# Patient Record
Sex: MALE | Race: WHITE | NOT HISPANIC OR LATINO | Employment: OTHER | URBAN - METROPOLITAN AREA
[De-identification: names, ages, dates, MRNs, and addresses within clinical notes are randomized per-mention and may not be internally consistent; named-entity substitution may affect disease eponyms.]

---

## 2019-08-07 ENCOUNTER — OFFICE VISIT (OUTPATIENT)
Dept: UROLOGY | Facility: CLINIC | Age: 64
End: 2019-08-07
Payer: COMMERCIAL

## 2019-08-07 VITALS
WEIGHT: 250 LBS | DIASTOLIC BLOOD PRESSURE: 72 MMHG | BODY MASS INDEX: 39.24 KG/M2 | HEART RATE: 80 BPM | SYSTOLIC BLOOD PRESSURE: 132 MMHG | HEIGHT: 67 IN

## 2019-08-07 DIAGNOSIS — R97.20 ELEVATED PROSTATE SPECIFIC ANTIGEN (PSA): Primary | ICD-10-CM

## 2019-08-07 PROCEDURE — 99202 OFFICE O/P NEW SF 15 MIN: CPT | Performed by: UROLOGY

## 2019-08-07 RX ORDER — LISINOPRIL 10 MG/1
10 TABLET ORAL DAILY
Status: ON HOLD | COMMUNITY
End: 2021-07-22

## 2019-08-07 RX ORDER — HYDROCHLOROTHIAZIDE 25 MG/1
25 TABLET ORAL DAILY
Status: ON HOLD | COMMUNITY
End: 2021-07-22

## 2019-08-07 RX ORDER — DEXTROAMPHETAMINE SACCHARATE, AMPHETAMINE ASPARTATE, DEXTROAMPHETAMINE SULFATE AND AMPHETAMINE SULFATE 2.5; 2.5; 2.5; 2.5 MG/1; MG/1; MG/1; MG/1
10 TABLET ORAL 2 TIMES DAILY
COMMUNITY
End: 2021-05-20

## 2019-08-07 RX ORDER — DEXTROAMPHETAMINE SACCHARATE, AMPHETAMINE ASPARTATE MONOHYDRATE, DEXTROAMPHETAMINE SULFATE AND AMPHETAMINE SULFATE 7.5; 7.5; 7.5; 7.5 MG/1; MG/1; MG/1; MG/1
30 CAPSULE, EXTENDED RELEASE ORAL DAILY
COMMUNITY

## 2019-08-07 ASSESSMENT — PAIN SCALES - GENERAL: PAINLEVEL: NO PAIN (0)

## 2019-08-07 ASSESSMENT — MIFFLIN-ST. JEOR: SCORE: 1882.62

## 2019-08-07 NOTE — LETTER
Date:August 8, 2019      Patient was self referred, no letter generated. Do not send.        [facility] Health Information

## 2019-08-07 NOTE — LETTER
8/7/2019       RE: Gil Chase  [address]  [place] [facility] [address]     Dear Colleague,    Thank you for referring your patient, Gil Chase, to the [facility] [place] [facility]. Please see a copy of my visit note below.    Gil Chase is a 63-year-old male with a PSA of 3.1 this year, 3.362 years ago and 1.9 in 2011.  He also showed me a PSA of 3.62 in 2018 from the [facility].  The patient has hypogonadal and has been on testosterone supplementation for years.  He has no family history of prostate cancer.  He is having no trouble voiding, dysuria or hematuria  Other past medical history: Vasectomy, vasectomy reversal, non-smoker, hypertension, ADD  Medications: Adderall, HydroDIURIL, lisinopril  Allergies: None  Exam: Alert and oriented, normal appearance other than abdominal obesity, normal vital signs, normal respirations, neuro grossly intact.  Normal sphincter tone, no rectal mass or impaction, benign and symmetric prostate, normal seminal vesicles  Assessment: No evidence for prostate cancer.  Hypogonadism- should resume testosterone supplementation and have regular follow-up for testosterone levels, PSA and ARLEEN yearly    Again, thank you for allowing me to participate in the care of your patient.      Sincerely,    Ze Warren MD

## 2019-08-07 NOTE — LETTER
8/7/2019      RE: Gli Chase  [address]  [place] [address]       Gil Chase is a 63-year-old male with a PSA of 3.1 this year, 3.362 years ago and 1.9 in 2011.  He also showed me a PSA of 3.62 in 2018 from the [facility].  The patient has hypogonadal and has been on testosterone supplementation for years.  He has no family history of prostate cancer.  He is having no trouble voiding, dysuria or hematuria  Other past medical history: Vasectomy, vasectomy reversal, non-smoker, hypertension, ADD  Medications: Adderall, HydroDIURIL, lisinopril  Allergies: None  Exam: Alert and oriented, normal appearance other than abdominal obesity, normal vital signs, normal respirations, neuro grossly intact.  Normal sphincter tone, no rectal mass or impaction, benign and symmetric prostate, normal seminal vesicles  Assessment: No evidence for prostate cancer.  Hypogonadism- should resume testosterone supplementation and have regular follow-up for testosterone levels, PSA and ARLEEN yearly    Ze Warren MD

## 2019-08-07 NOTE — PROGRESS NOTES
iGl Chase is a 63-year-old male with a PSA of 3.1 this year, 3.362 years ago and 1.9 in 2011.  He also showed me a PSA of 3.62 in 2018 from the [facility].  The patient has hypogonadal and has been on testosterone supplementation for years.  He has no family history of prostate cancer.  He is having no trouble voiding, dysuria or hematuria  Other past medical history: Vasectomy, vasectomy reversal, non-smoker, hypertension, ADD  Medications: Adderall, HydroDIURIL, lisinopril  Allergies: None  Exam: Alert and oriented, normal appearance other than abdominal obesity, normal vital signs, normal respirations, neuro grossly intact.  Normal sphincter tone, no rectal mass or impaction, benign and symmetric prostate, normal seminal vesicles  Assessment: No evidence for prostate cancer.  Hypogonadism- should resume testosterone supplementation and have regular follow-up for testosterone levels, PSA and ARLEEN yearly

## 2019-08-07 NOTE — NURSING NOTE
"Chief Complaint   Patient presents with     Elevated PSA     Pt is here for Elevated PSA to est. care       Patient Active Problem List   Diagnosis   (none) - all problems resolved or deleted       No Known Allergies    /72   Pulse 80   Ht 1.702 m (5' 7\")   Wt 113.4 kg (250 lb)   BMI 39.16 kg/m            Davie Martell, EMT-B  8/7/2019         "

## 2021-05-20 ENCOUNTER — HOSPITAL ENCOUNTER (OUTPATIENT)
Facility: CLINIC | Age: 66
Setting detail: OBSERVATION
Discharge: HOME | End: 2021-05-21
Attending: EMERGENCY MEDICINE | Admitting: HOSPITALIST
Payer: COMMERCIAL

## 2021-05-20 DIAGNOSIS — L03.311 CELLULITIS OF ABDOMINAL WALL: ICD-10-CM

## 2021-05-20 DIAGNOSIS — N28.9 RENAL INSUFFICIENCY: ICD-10-CM
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
## 2021-05-20 DIAGNOSIS — I44.0 1ST DEGREE AV BLOCK: ICD-10-CM

## 2021-05-20 LAB
ANION GAP SERPL CALCULATED.3IONS-SCNC: 7 MMOL/L (ref 3–14)
B BURGDOR IGG+IGM SER QL: 0.29 (ref 0–0.89)
BASOPHILS # BLD AUTO: 0.1 10E9/L (ref 0–0.2)
BASOPHILS NFR BLD AUTO: 1.3 %
BUN SERPL-MCNC: 20 MG/DL (ref 7–30)
CALCIUM SERPL-MCNC: 9.1 MG/DL (ref 8.5–10.1)
CHLORIDE SERPL-SCNC: 104 MMOL/L (ref 94–109)
CO2 SERPL-SCNC: 28 MMOL/L (ref 20–32)
CREAT SERPL-MCNC: 1.41 MG/DL (ref 0.66–1.25)
CRP SERPL-MCNC: 15.7 MG/L (ref 0–8)
DIFFERENTIAL METHOD BLD: NORMAL
EOSINOPHIL # BLD AUTO: 0.4 10E9/L (ref 0–0.7)
EOSINOPHIL NFR BLD AUTO: 5.4 %
ERYTHROCYTE [DISTWIDTH] IN BLOOD BY AUTOMATED COUNT: 13.2 % (ref 10–15)
GFR SERPL CREATININE-BSD FRML MDRD: 51 ML/MIN/{1.73_M2}
GLUCOSE SERPL-MCNC: 89 MG/DL (ref 70–99)
HCT VFR BLD AUTO: 42.4 % (ref 40–53)
HGB BLD-MCNC: 14.3 G/DL (ref 13.3–17.7)
IMM GRANULOCYTES # BLD: 0 10E9/L (ref 0–0.4)
IMM GRANULOCYTES NFR BLD: 0.3 %
INTERPRETATION ECG - MUSE: NORMAL
LABORATORY COMMENT REPORT: NORMAL
LACTATE BLD-SCNC: 0.9 MMOL/L (ref 0.7–2)
LYMPHOCYTES # BLD AUTO: 1.6 10E9/L (ref 0.8–5.3)
LYMPHOCYTES NFR BLD AUTO: 21.6 %
MCH RBC QN AUTO: 29.6 PG (ref 26.5–33)
MCHC RBC AUTO-ENTMCNC: 33.7 G/DL (ref 31.5–36.5)
MCV RBC AUTO: 88 FL (ref 78–100)
MONOCYTES # BLD AUTO: 0.6 10E9/L (ref 0–1.3)
MONOCYTES NFR BLD AUTO: 7.9 %
NEUTROPHILS # BLD AUTO: 4.8 10E9/L (ref 1.6–8.3)
NEUTROPHILS NFR BLD AUTO: 63.5 %
NRBC # BLD AUTO: 0 10*3/UL
NRBC BLD AUTO-RTO: 0 /100
PLATELET # BLD AUTO: 203 10E9/L (ref 150–450)
POTASSIUM SERPL-SCNC: 3.2 MMOL/L (ref 3.4–5.3)
RBC # BLD AUTO: 4.83 10E12/L (ref 4.4–5.9)
SARS-COV-2 RNA RESP QL NAA+PROBE: NEGATIVE
SODIUM SERPL-SCNC: 139 MMOL/L (ref 133–144)
SPECIMEN SOURCE: NORMAL
WBC # BLD AUTO: 7.6 10E9/L (ref 4–11)

## 2021-05-20 PROCEDURE — 99204 OFFICE O/P NEW MOD 45 MIN: CPT | Performed by: SURGERY

## 2021-05-20 PROCEDURE — 96375 TX/PRO/DX INJ NEW DRUG ADDON: CPT

## 2021-05-20 PROCEDURE — 99220 PR INITIAL OBSERVATION CARE,LEVEL III: CPT | Performed by: PHYSICIAN ASSISTANT

## 2021-05-20 PROCEDURE — 83605 ASSAY OF LACTIC ACID: CPT | Performed by: EMERGENCY MEDICINE

## 2021-05-20 PROCEDURE — 96365 THER/PROPH/DIAG IV INF INIT: CPT

## 2021-05-20 PROCEDURE — 76604 US EXAM CHEST: CPT

## 2021-05-20 PROCEDURE — 86618 LYME DISEASE ANTIBODY: CPT | Performed by: EMERGENCY MEDICINE

## 2021-05-20 PROCEDURE — C9803 HOPD COVID-19 SPEC COLLECT: HCPCS

## 2021-05-20 PROCEDURE — 250N000011 HC RX IP 250 OP 636: Performed by: EMERGENCY MEDICINE

## 2021-05-20 PROCEDURE — 258N000003 HC RX IP 258 OP 636: Performed by: HOSPITALIST

## 2021-05-20 PROCEDURE — G0378 HOSPITAL OBSERVATION PER HR: HCPCS

## 2021-05-20 PROCEDURE — 85025 COMPLETE CBC W/AUTO DIFF WBC: CPT | Performed by: EMERGENCY MEDICINE

## 2021-05-20 PROCEDURE — 36415 COLL VENOUS BLD VENIPUNCTURE: CPT | Performed by: EMERGENCY MEDICINE

## 2021-05-20 PROCEDURE — 250N000013 HC RX MED GY IP 250 OP 250 PS 637: Performed by: PHYSICIAN ASSISTANT

## 2021-05-20 PROCEDURE — 80048 BASIC METABOLIC PNL TOTAL CA: CPT | Performed by: EMERGENCY MEDICINE

## 2021-05-20 PROCEDURE — 99285 EMERGENCY DEPT VISIT HI MDM: CPT | Mod: 25

## 2021-05-20 PROCEDURE — 99207 PR CDG-CODE CATEGORY CHANGED: CPT | Performed by: PHYSICIAN ASSISTANT

## 2021-05-20 PROCEDURE — 250N000011 HC RX IP 250 OP 636: Performed by: HOSPITALIST

## 2021-05-20 PROCEDURE — 86140 C-REACTIVE PROTEIN: CPT | Performed by: EMERGENCY MEDICINE

## 2021-05-20 PROCEDURE — 250N000013 HC RX MED GY IP 250 OP 250 PS 637: Performed by: EMERGENCY MEDICINE

## 2021-05-20 PROCEDURE — 87040 BLOOD CULTURE FOR BACTERIA: CPT | Performed by: EMERGENCY MEDICINE

## 2021-05-20 PROCEDURE — 87635 SARS-COV-2 COVID-19 AMP PRB: CPT | Performed by: EMERGENCY MEDICINE

## 2021-05-20 PROCEDURE — 93005 ELECTROCARDIOGRAM TRACING: CPT

## 2021-05-20 RX ORDER — CEFAZOLIN SODIUM 2 G/100ML
2 INJECTION, SOLUTION INTRAVENOUS EVERY 8 HOURS
Status: DISCONTINUED | OUTPATIENT
Start: 2021-05-20 | End: 2021-05-20

## 2021-05-20 RX ORDER — DOXYCYCLINE 100 MG/1
100 CAPSULE ORAL 2 TIMES DAILY
Status: ON HOLD | COMMUNITY
End: 2021-05-21

## 2021-05-20 RX ORDER — NALOXONE HYDROCHLORIDE 0.4 MG/ML
0.2 INJECTION, SOLUTION INTRAMUSCULAR; INTRAVENOUS; SUBCUTANEOUS
Status: DISCONTINUED | OUTPATIENT
Start: 2021-05-20 | End: 2021-05-21 | Stop reason: HOSPADM

## 2021-05-20 RX ORDER — POLYETHYLENE GLYCOL 3350 17 G/17G
17 POWDER, FOR SOLUTION ORAL DAILY PRN
Status: DISCONTINUED | OUTPATIENT
Start: 2021-05-20 | End: 2021-05-21 | Stop reason: HOSPADM

## 2021-05-20 RX ORDER — OXYCODONE HYDROCHLORIDE 5 MG/1
5-10 TABLET ORAL
Status: DISCONTINUED | OUTPATIENT
Start: 2021-05-20 | End: 2021-05-21 | Stop reason: HOSPADM

## 2021-05-20 RX ORDER — DEXTROAMPHETAMINE SACCHARATE, AMPHETAMINE ASPARTATE MONOHYDRATE, DEXTROAMPHETAMINE SULFATE AND AMPHETAMINE SULFATE 7.5; 7.5; 7.5; 7.5 MG/1; MG/1; MG/1; MG/1
30 CAPSULE, EXTENDED RELEASE ORAL DAILY
Status: DISCONTINUED | OUTPATIENT
Start: 2021-05-21 | End: 2021-05-21 | Stop reason: HOSPADM

## 2021-05-20 RX ORDER — NALOXONE HYDROCHLORIDE 0.4 MG/ML
0.4 INJECTION, SOLUTION INTRAMUSCULAR; INTRAVENOUS; SUBCUTANEOUS
Status: DISCONTINUED | OUTPATIENT
Start: 2021-05-20 | End: 2021-05-21 | Stop reason: HOSPADM

## 2021-05-20 RX ORDER — DOXYCYCLINE 100 MG/1
100 CAPSULE ORAL 2 TIMES DAILY
Status: DISCONTINUED | OUTPATIENT
Start: 2021-05-20 | End: 2021-05-21 | Stop reason: HOSPADM

## 2021-05-20 RX ORDER — POTASSIUM CHLORIDE 1500 MG/1
40 TABLET, EXTENDED RELEASE ORAL ONCE
Status: COMPLETED | OUTPATIENT
Start: 2021-05-20 | End: 2021-05-20

## 2021-05-20 RX ORDER — CEFAZOLIN SODIUM 2 G/100ML
2 INJECTION, SOLUTION INTRAVENOUS ONCE
Status: COMPLETED | OUTPATIENT
Start: 2021-05-20 | End: 2021-05-20

## 2021-05-20 RX ORDER — LISINOPRIL 10 MG/1
10 TABLET ORAL DAILY
Status: DISCONTINUED | OUTPATIENT
Start: 2021-05-21 | End: 2021-05-21 | Stop reason: HOSPADM

## 2021-05-20 RX ORDER — HYDROCHLOROTHIAZIDE 25 MG/1
25 TABLET ORAL DAILY
Status: DISCONTINUED | OUTPATIENT
Start: 2021-05-21 | End: 2021-05-21 | Stop reason: HOSPADM

## 2021-05-20 RX ORDER — ACETAMINOPHEN 325 MG/1
650 TABLET ORAL EVERY 4 HOURS PRN
Status: DISCONTINUED | OUTPATIENT
Start: 2021-05-20 | End: 2021-05-21 | Stop reason: HOSPADM

## 2021-05-20 RX ORDER — AMOXICILLIN 250 MG
2 CAPSULE ORAL 2 TIMES DAILY PRN
Status: DISCONTINUED | OUTPATIENT
Start: 2021-05-20 | End: 2021-05-21 | Stop reason: HOSPADM

## 2021-05-20 RX ORDER — DEXTROAMPHETAMINE SACCHARATE, AMPHETAMINE ASPARTATE, DEXTROAMPHETAMINE SULFATE AND AMPHETAMINE SULFATE 2.5; 2.5; 2.5; 2.5 MG/1; MG/1; MG/1; MG/1
20 TABLET ORAL DAILY
Status: DISCONTINUED | OUTPATIENT
Start: 2021-05-20 | End: 2021-05-21 | Stop reason: HOSPADM

## 2021-05-20 RX ORDER — LIDOCAINE 40 MG/G
CREAM TOPICAL
Status: DISCONTINUED | OUTPATIENT
Start: 2021-05-20 | End: 2021-05-21 | Stop reason: HOSPADM

## 2021-05-20 RX ORDER — ONDANSETRON 2 MG/ML
4 INJECTION INTRAMUSCULAR; INTRAVENOUS EVERY 6 HOURS PRN
Status: DISCONTINUED | OUTPATIENT
Start: 2021-05-20 | End: 2021-05-21 | Stop reason: HOSPADM

## 2021-05-20 RX ORDER — AMOXICILLIN 250 MG
1 CAPSULE ORAL 2 TIMES DAILY PRN
Status: DISCONTINUED | OUTPATIENT
Start: 2021-05-20 | End: 2021-05-21 | Stop reason: HOSPADM

## 2021-05-20 RX ORDER — ONDANSETRON 4 MG/1
4 TABLET, ORALLY DISINTEGRATING ORAL EVERY 6 HOURS PRN
Status: DISCONTINUED | OUTPATIENT
Start: 2021-05-20 | End: 2021-05-21 | Stop reason: HOSPADM

## 2021-05-20 RX ORDER — ACETAMINOPHEN 650 MG/1
650 SUPPOSITORY RECTAL EVERY 4 HOURS PRN
Status: DISCONTINUED | OUTPATIENT
Start: 2021-05-20 | End: 2021-05-21 | Stop reason: HOSPADM

## 2021-05-20 RX ORDER — DEXTROAMPHETAMINE SACCHARATE, AMPHETAMINE ASPARTATE, DEXTROAMPHETAMINE SULFATE AND AMPHETAMINE SULFATE 5; 5; 5; 5 MG/1; MG/1; MG/1; MG/1
20 TABLET ORAL DAILY
COMMUNITY

## 2021-05-20 RX ADMIN — DOXYCYCLINE HYCLATE 100 MG: 100 CAPSULE ORAL at 20:13

## 2021-05-20 RX ADMIN — VANCOMYCIN HYDROCHLORIDE 1500 MG: 10 INJECTION, POWDER, LYOPHILIZED, FOR SOLUTION INTRAVENOUS at 18:25

## 2021-05-20 RX ADMIN — POTASSIUM CHLORIDE 40 MEQ: 1500 TABLET, EXTENDED RELEASE ORAL at 10:20

## 2021-05-20 RX ADMIN — CEFAZOLIN SODIUM 2 G: 2 INJECTION, SOLUTION INTRAVENOUS at 10:23

## 2021-05-20 ASSESSMENT — ENCOUNTER SYMPTOMS
NAUSEA: 0
ARTHRALGIAS: 0
FATIGUE: 1
HEADACHES: 0
COLOR CHANGE: 1
CHILLS: 0
FEVER: 0

## 2021-05-20 ASSESSMENT — MIFFLIN-ST. JEOR: SCORE: 1725.2

## 2021-05-20 NOTE — PLAN OF CARE
PRIMARY DIAGNOSIS: CELLULITIS   OUTPATIENT/OBSERVATION GOALS TO BE MET BEFORE DISCHARGE:  1. Vitals sign stable or return to baseline: Yes    2. Tolerating oral antibiotics or has home infusion set up if applicable:  IV Ancef     3. Pain status: Pain free.    4. Return to near baseline physical activity: Yes    Patient alert and oriented x4. Vitals are Temp: 98.6  F (37  C) Temp src: Oral BP: (!) 145/78 Pulse: 75   Resp: 16 SpO2: 100 % RA. Denies pain. Umbilicus and surrounding skin is red and hot to touch, not expanded beyond borders. IV saline locked. Tolerating regular diet. Up independently in room. General surgery consult, no intervention at this time- to readdress tomorrow. Continuing with supportive cares and symptom management.     Discharge Planner Nurse   Safe discharge environment identified: Yes  Barriers to discharge: No       Entered by: Carolina Olmos 05/20/2021     Please review provider order for any additional goals.     Nurse to notify provider when observation goals have been met and patient is ready for discharge.

## 2021-05-20 NOTE — CONSULTS
General surgery consult note    Reason for consultation: Abdominal wall cellulitis    History of present illness: This is a 65-year-old gentleman who was recently found to have an engorged tick in his umbilicus.  He has developed cellulitis centered around the umbilicus.  He was initially treated with doxycycline and had expansion of an area of erythema surrounding this.  He presented to the emergency room this morning.  He has just noticed a bit of drainage from the umbilicus today.  He states that the tick was actually within his umbilicus.  The patient denies fever or chills.  Denies arthralgias.  Denies any urinary symptoms.  The patient does not smoke.    Past medical history:  Type 2 diabetes  Hypertension  ADHD    Past Surgical History:   Procedure Laterality Date     AR REV VASO-VASECTOMY ADJ       VASECTOMY      pt has vas reversal     10 point review of systems is negative other than those issues noted above.    Physical exam:  The patient is in no obvious distress.  Breathing is nonlabored.  The abdomen reveals a broad area of erythema centered around the umbilicus.  Within the umbilicus is a small area of grayish skin with a small open area.  The patient indicates that this is the site of the tick bite.  There is no obvious fluctuance.  Drainage from the region is serous.    Assessment and plan: This is a patient with an approximate 25 cm area of erythema surrounding the umbilicus.  He was just started on Ancef this morning.  It might be my leaning to include MRSA coverage as well, particularly given the broad area involved.  If he does not have significant improvement in the next 24 hours, it might be worth considering adding anaerobic coverage or obtaining ID consultation.  At this point, I do not see any obvious indication for surgical debridement.  The patient is currently eating dinner, and operative intervention would not currently be an option.  I think it would be worth keeping him n.p.o. after  midnight just in case some sort of surgical intervention might be necessary tomorrow.  I am not currently anticipating surgical intervention, but would like to keep our options open.    Silvano Jett MD  [facility]

## 2021-05-20 NOTE — ED TRIAGE NOTES
Patient presents with tick bite from April 23-24th. He was seen at PCP on 5/17 and found to have the tick still embedded, put on Doxycycline. Redness, swelling has increased, and was told to come in.

## 2021-05-20 NOTE — PLAN OF CARE
PRIMARY DIAGNOSIS: CELLULITIS   OUTPATIENT/OBSERVATION GOALS TO BE MET BEFORE DISCHARGE:  Vitals sign stable or return to baseline: Yes    Tolerating oral antibiotics or has home infusion set up if applicable:  IV Ancef     Pain status: Pain free.    Return to near baseline physical activity: Yes    Patient alert and oriented x4. Vitals are Temp: 96.2  F (35.7  C) Temp src: Oral BP: 133/77 Pulse: 68   Resp: 16 SpO2: 98 % RA. Denies pain. Umbilicus and surrounding skin is red and hot to touch, not expanded beyond borders. IV saline locked. Tolerating regular diet. Up independently in room. General surgery consult. Continuing with supportive cares and symptom management.     Discharge Planner Nurse   Safe discharge environment identified: Yes  Barriers to discharge: No       Entered by: Carolina Olmos 05/20/2021     Please review provider order for any additional goals.     Nurse to notify provider when observation goals have been met and patient is ready for discharge.

## 2021-05-20 NOTE — ED PROVIDER NOTES
History   Chief Complaint:  Insect Bite     The history is provided by the patient.      Gil Chase is a 65 year old male with history of type II diabetes and hypertension who presents with an insect bite. The patient [place] and began to feel pressure and pain around his umbilicus last Saturday, prompting him to be evaluated on Monday where he was given Doxycycline. He was seen earlier today by his primary care physician for worsening symptoms of pain, fatigue, and increasing redness around the umbilicus and was referred here. Denies fevers, chills, drainage from the umbilicus, joint pain, headaches, or nausea.     Review of Systems   Constitutional: Positive for fatigue. Negative for chills and fever.   Gastrointestinal: Negative for nausea.   Musculoskeletal: Negative for arthralgias.   Skin: Positive for color change and rash.   Neurological: Negative for headaches.   All other systems reviewed and are negative.    Allergies:  No known drug allergies    Medications:  Vibramycin  Adderall  Adderall XR  Prinvil, Zestril  Hydrochlorothiazide  Viagra  Metformin     Past Medical History:    Type II diabetes  ADHD  Erectile dysfunction of organic origin  Hypogonadism male  Hypertension  Glucose intolerance  Sleep apnea  Benign neoplasm of colon  Obesity    Past Surgical History:    Vasectomy  Vasectomy, reversal   ORIF, left femur     Social History:  Presents alone. Denies smoking.       Physical Exam     Patient Vitals for the past 24 hrs:   BP Temp Temp src Pulse Resp SpO2   05/20/21 0833 (!) 140/86 96.4  F (35.8  C) Temporal 68 18 98 %       Physical Exam  General:   Well-nourished   Speaking in full sentences  Eyes:   Conjunctiva without injection or scleral icterus  ENT:   Moist mucous membranes   Nares patent   Pinnae normal  Neck:   Full ROM   No stiffness appreciated  Resp:   Lungs CTAB   No crackles, wheezing or audible rubs   Good air  movement  CV:    Normal rate, regular rhythm   S1 and S2 present   No murmur, gallop or rub  GI:   BS present   Abdomen soft without distention   Large circular erythematous rash centered around umbilicus   Scant drainage from umbilicus   Erythema has extended beyond borders of prior outline from Monday     Skin:   Warm, dry, well perfused   No rashes or open wounds on exposed skin  MSK:   Moves all extremities   No focal deformities or swelling  Neuro:   Alert   Answers questions appropriately   Moves all extremities equally   Gait stable  Psych:   Normal affect, normal mood    Emergency Department Course   ECG  ECG taken at 1027, ECG read at 1027  Sinus rhythm with 1st degree AV block  Otherwise normal ECG   Rate 69 bpm. NE interval 216 ms. QRS duration 106 ms.   QT/QTc 416/445 ms. P-R-T axes 33 -17 40.     Imaging:  POC US SOFT TISSUE  Limited Bedside ED Ultrasound of Soft Tissue Procedure Note:    PROCEDURE: PERFORMED BY: Dr. Carrillo Miles MD  INDICATIONS/SYMPTOM: Skin redness, evaluate for abscess, cellulitis or foreign body  PROBE: High frequency linear probe  BODY LOCATION: Soft tissue located on trunk     FINDINGS: Cobblestoning of soft tissue: present   Hypoechoic fluid (ie abscess) identified: absent     INTERPRETATION:  The soft tissue and muscle layers were evaluated.      Findings indicate cellulitis    IMAGE DOCUMENTATION: Images were archived to PACs system.    Laboratory:  CBC: WBC 7.6, HGB 14.3,    BMP: Potassium 3.2(L), Creatinine 1.41(H), GFR 51(L) o/w WNL  CRP inflammation: 15.7(H)  Blood culture x2: Pending  Lactic acid (Resulted 0924): 0.9  Lyme Disease Hayley with reflex to WB serum: Pending    Emergency Department Course:    Reviewed:  I reviewed nursing notes, vitals, past medical history and care everywhere    Assessments:  0859 I obtained history and examined the patient as noted above.   0931 I performed a bedside ultrasound.   1015 I rechecked the patient and explained  findings.     Consults:   1026 D/W hospitalist, Hortencia Baum.     Interventions:  1020 Klor-Con M 40 mEq Oral  1023 Ancef 2 g IV    Disposition:  The patient was admitted to the hospital under the care of Dr. Ram.     Impression & Plan   CMS Diagnoses: None     Medical Decision Making:  Gil Chase is a 65-year-old male presenting to the emergency department for evaluation of an insect bite.  VS on presentation reveal elevated BP although otherwise are unremarkable.  Examination as outlined above notable for large area of erythema centered around the umbilicus consistent with cellulitis.  On exam, and following ultrasound, no evidence at this time of deeper space abscess amenable to percutaneous drainage.  His erythema has extended beyond the borders previously outlined, and thus I have concern for failure of outpatient antibiotic therapy.  Laboratory evaluation reveals normal WBC count, though elevated CRP.  Lactate presently normal, and patient not meeting criteria for severe sepsis nor septic shock.  Blood cultures x2 obtained and are presently pending.  The region of cellulitis may be secondary to superimposed bacterial infection following the insect bite.  Underlying Lyme's disease on the differential.  Lyme's testing will be sent from the ER, the results of which are presently pending.  He will likely benefit from ongoing treatment with doxycycline.  Remainder of skin exam without evidence of erythema migrans, nor does patient have associated headache, neck stiffness, or AMS to suggest meningitis.  EKG demonstrates sinus rhythm without evidence of 3rd degree AV block to suggest Lyme carditis, though he does have 1st degree AV block.  Given failure of outpatient treatment, patient will be started on Ancef, which has better strep coverage than oral doxycycline.  Patient has no prior history of MRSA, and wound is appeared nonpurulent.  Will require admission for response to therapy.  If patient fails to  respond to additional IV antibiotics, may consider repeat imaging down the road.  Case discussed with MARIA ELENA Betancourt. The hospital service who has accepted the patient for admission.     Covid-19  Gil Chase was evaluated during a global COVID-19 pandemic, which necessitated consideration that the patient might be at risk for infection with the SARS-CoV-2 virus that causes COVID-19.   Applicable protocols for evaluation were followed during the patient's care. COVID-19 was considered as part of the patient's evaluation. The plan for testing is: a test was obtained during this visit.    Diagnosis:    ICD-10-CM    1. Cellulitis of abdominal wall  L03.311 Basic metabolic panel     CRP inflammation     Blood culture     Asymptomatic SARS-CoV-2 COVID-19 Virus (Coronavirus) by PCR     Lyme Disease Hayley with reflex to WB Serum     Lyme Disease Hayley with reflex to WB Serum     CANCELED: Lyme Disease Hayley with reflex to WB Serum   2. Tick bite, initial encounter  W57.XXXA    3. Renal insufficiency  N28.9    4. 1st degree AV block  I44.0      Scribe Disclosure:  Monserrat DELACRUZ, am serving as a scribe at 8:56 AM on 5/20/2021 to document services personally performed by Carrillo Miles MD based on my observations and the provider's statements to me.              Carrillo Miles MD  05/20/21 1820

## 2021-05-20 NOTE — PROGRESS NOTES
ROOM # 207    Living Situation (if not independent, order SW consult): home with wife and daughter   Facility name:  : [facility] [phone]    Activity level at baseline: Independent   Activity level on admit: Independent       Patient registered to observation; given Patient Bill of Rights; given the opportunity to ask questions about observation status and their plan of care.  Patient has been oriented to the observation room, bathroom and call light is in place.    Discussed discharge goals and expectations with patient/family.

## 2021-05-20 NOTE — H&P
History and Physical     Gil Chase MRN# [id number]   YOB: 1955 Age: 65 year old      Date of Admission:  5/20/2021    Primary care provider: No Ref-Primary, Physician          Assessment and Plan:   Gil Chase is a 65 year old male with a PMH significant for DM II,  HTN and ADHD who presents with abdominal wall redness, swelling and pain after tick bite.     Patient was discussed with Dr. Miles, who was provider in ED. Chart review of ED work up was reviewed as well as chart review of Care Everywhere, previous visits and admissions.     ADDENDUM: surgery consulted and does not think drainage is necessary. They recommend switching Ancef to Vancomycin and if no improvement tomorrow to add anaerobic coverage and consult ID    #Abdominal wall cellulitis s/p tick bite  Noted to have worsening erythema, pain and induration surrounding umbilicus for weeks. Seen in clinic on 5/18 and tick removed from umbilicus. Redness outlined and started on doxycycline but redness spread beyond borders and induration around umbilicus worsening. No systemic symptoms. Afebrile, vitally stable and WBC normal. Ultrasound of abdomen did not show obvious fluid collection. Started on Ancef in ED.  -Continue Ancef  -General surgery consult in case drainage is needed  -Continue doxycycline for lymes prophylaxis  -Monitor for signs of worsening  -Last TDAP in 8/2019    #DM II  A1c was 4.8 and glucose in ED 89  -Continue Metformin and Semaglutide    #HTN  -Continue Lisinopril and HCTZ    #Hypokalemia  Potassium 3.2, uses hydrochlorothiazide which is likely the reason  -Potassium 40 meq po once    #HLP  -Continue statin    #ADHD  -Continue Adderall          Social: No concerns  Code: Discussed with patient and they have chosen full code  VTE prophylaxis: Lovenox  Disposition: Observation                    Chief Complaint:   Abdominal wall redness, swelling and pain         History of Present Illness:   Gil Chase is a  65 year old male who presents with with abdominal wall redness, swelling and pain. He states for the last few weeks he has noted increased swelling and discomfort in his abdomen near his belly button. He has also noted some redness but no drainage from this area. He went to the clinic on 5/18 where they found an engorged tick in his umbilicus that had probably been there since his trip [place] in [place]. Tick was removed and he was started on Doxycycline with the redness around umbilicus outlined but the redness has spread beyond those borders. He is tender around the umbilicus and denies drainage from where the tick was. He denies fever, chills, arthralgias, nausea, vomiting, diarrhea and urinary symptoms. He does not drink alcohol or smoke cigarettes.              Past Medical History:   DM II  HTN  ADHD            Past Surgical History:     Past Surgical History:   Procedure Laterality Date     AR REV VASO-VASECTOMY ADJ       VASECTOMY      pt has vas reversal               Social History:     Social History     Socioeconomic History     Marital status:      Spouse name: Not on file     Number of children: Not on file     Years of education: Not on file     Highest education level: Not on file   Occupational History     Not on file   Social Needs     Financial resource strain: Not on file     Food insecurity     Worry: Not on file     Inability: Not on file     Transportation needs     Medical: Not on file     Non-medical: Not on file   Tobacco Use     Smoking status: Never Smoker     Smokeless tobacco: Never Used   Substance and Sexual Activity     Alcohol use: Yes     Comment: 2oz wk     Drug use: Not on file     Sexual activity: Yes   Lifestyle     Physical activity     Days per week: Not on file     Minutes per session: Not on file     Stress: Not on file   Relationships     Social connections     Talks on phone: Not on file     Gets together: Not on file     Attends Orthodox service: Not on file      Active member of club or organization: Not on file     Attends meetings of clubs or organizations: Not on file     Relationship status: Not on file     Intimate partner violence     Fear of current or ex partner: Not on file     Emotionally abused: Not on file     Physically abused: Not on file     Forced sexual activity: Not on file   Other Topics Concern     Parent/sibling w/ CABG, MI or angioplasty before 65F 55M? Not Asked   Social History Narrative     Not on file               Family History:   Family history reviewed and is non contributory         Allergies:    No Known Allergies            Medications:     Prior to Admission medications    Medication Sig Last Dose Taking? Auth Provider   amphetamine-dextroamphetamine (ADDERALL XR) 30 MG 24 hr capsule Take 30 mg by mouth daily   Reported, Patient   amphetamine-dextroamphetamine (ADDERALL) 10 MG tablet Take 10 mg by mouth 2 times daily   Reported, Patient   hydrochlorothiazide (HYDRODIURIL) 25 MG tablet Take 25 mg by mouth daily    Reported, Patient   lisinopril (PRINIVIL/ZESTRIL) 10 MG tablet Take 10 mg by mouth daily   Reported, Patient              Review of Systems:   A Comprehensive greater than 10 system review of systems was carried out.  Pertinent positives and negatives are noted above.  Otherwise negative for contributory information.            Physical Exam:   Blood pressure (!) 140/86, pulse 68, temperature 96.4  F (35.8  C), temperature source Temporal, resp. rate 18, SpO2 98 %.  Exam:  GENERAL:  Comfortable.  PSYCH: pleasant, oriented, No acute distress.  HEENT:  PERRLA. Normal conjunctiva, normal hearing, nasal mucosa and Oropharynx are normal.  NECK:  Supple, no neck vein distention, adenopathy or bruits, normal thyroid.  HEART:  Normal S1, S2 with no murmur, no pericardial rub, gallops or S3 or S4.  LUNGS:  Clear to auscultation, normal Respiratory effort. No wheezing, rales or ronchi.  ABDOMEN:  Soft, no hepatosplenomegaly, normal bowel  sounds. Erythema noted about 12 inches in diameter surrounding umbilicus. Some induration noted around umbilicus but no fluctuance.   EXTREMITIES:  No pedal edema, +2 pulses bilateral and equal.  SKIN:  Dry to touch, No rash, wound or ulcerations.  NEUROLOGIC:  CN 2-12 grossly intact, sensation is intact with no focal deficits.               Data:     Recent Labs   Lab 05/20/21  0909   WBC 7.6   HGB 14.3   HCT 42.4   MCV 88        Recent Labs   Lab 05/20/21  0909      POTASSIUM 3.2*   CHLORIDE 104   CO2 28   ANIONGAP 7   GLC 89   BUN 20   CR 1.41*   GFRESTIMATED 51*   GFRESTBLACK 60*   RACHEL 9.1     Recent Labs   Lab 05/20/21  0909   CRP 15.7*     Recent Labs   Lab 05/20/21  0904   LACT 0.9         Recent Results (from the past 24 hour(s))   POC US SOFT TISSUE    Impression    Limited Bedside ED Ultrasound of Soft Tissue Procedure Note:    PROCEDURE: PERFORMED BY: Dr. Carrillo Miles MD  INDICATIONS/SYMPTOM: Skin redness, evaluate for abscess, cellulitis or foreign body  PROBE: High frequency linear probe  BODY LOCATION: Soft tissue located on trunk     FINDINGS: Cobblestoning of soft tissue: present   Hypoechoic fluid (ie abscess) identified: absent     INTERPRETATION:  The soft tissue and muscle layers were evaluated.      Findings indicate cellulitis    IMAGE DOCUMENTATION: Images were archived to PACs system.             Henny Baum PA-C    This patient was seen and discussed with Dr. Ram who agrees with the current plans as outlined above.

## 2021-05-20 NOTE — PHARMACY-ADMISSION MEDICATION HISTORY
Admission medication history interview status for this patient is complete. See [facility] admission navigator for allergy information, prior to admission medications and immunization status.     Medication history interview done, indicate source(s): Patient  Medication history resources (including written lists, pill bottles, clinic record):None  Pharmacy: [facility] [place] and [facility] pharmacy.    Changes made to PTA medication list:  Added: doxycycline, semaglutide  Deleted: none  Changed: adderall IR    Actions taken by pharmacist (provider contacted, etc):None     Additional medication history information: Metformin 1g bid placed ON HOLD by MD about two weeks ago due to low BGs. Semaglutide weekly on Tuesdays. Patient stated that he will not be needing his IR Adderall while hospitalized. Doxycycline: so far, took 7 doses at home, prescribed x 28 doses.    Medication reconciliation/reorder completed by provider prior to medication history?  no   (Y/N)     For patients on insulin therapy: no    Prior to Admission medications    Medication Sig Last Dose Taking? Auth Provider   amphetamine-dextroamphetamine (ADDERALL XR) 30 MG 24 hr capsule Take 30 mg by mouth daily 5/20/2021 at am Yes Reported, Patient   amphetamine-dextroamphetamine (ADDERALL) 20 MG tablet Take 20 mg by mouth daily 5/19/2021 at Unknown time Yes Unknown, Entered By History   doxycycline hyclate (VIBRAMYCIN) 100 MG capsule Take 100 mg by mouth 2 times daily X14 days 5/20/2021 at am Yes Unknown, Entered By History   hydrochlorothiazide (HYDRODIURIL) 25 MG tablet Take 25 mg by mouth daily  5/20/2021 at am Yes Reported, Patient   lisinopril (PRINIVIL/ZESTRIL) 10 MG tablet Take 10 mg by mouth daily 5/20/2021 at am Yes Reported, Patient   SEMAGLUTIDE,0.25 OR 0.5MG/DOS, SC Inject 0.5 mg Subcutaneous once a week On Tuesdays 5/18/2021 Yes Unknown, Entered By History

## 2021-05-20 NOTE — ED NOTES
[facility]  ED Nurse Handoff Report    Gil Chase is a 65 year old male   ED Chief complaint: Insect Bite  . ED Diagnosis:   Final diagnoses:   Cellulitis of abdominal wall   Tick bite, initial encounter   Renal insufficiency   1st degree AV block     Allergies: No Known Allergies    Code Status: Full Code  Activity level - Baseline/Home:  Independent. Activity Level - Current:   Independent. Lift room needed: No. Bariatric: No   Needed: No   Isolation: No. Infection: Not Applicable.     Vital Signs:   Vitals:    05/20/21 0833   BP: (!) 140/86   Pulse: 68   Resp: 18   Temp: 96.4  F (35.8  C)   TempSrc: Temporal   SpO2: 98%       Cardiac Rhythm:  ,      Pain level:    Patient confused: No. Patient Falls Risk: Yes.   Elimination Status: Has voided   Patient Report - Initial Complaint: cellulitis abdomen. Focused Assessment:    65-year-old male presenting to the emergency department for evaluation of an insect bite.  VS on presentation reveal elevated BP although otherwise are unremarkable.  Examination as outlined above notable for large area of erythema centered around the umbilicus consistent with cellulitis.  On exam, and following ultrasound, no evidence at this time of deeper space abscess amenable to percutaneous drainage.  His erythema has extended beyond the borders previously outlined, and thus I have concern for failure of outpatient antibiotic therapy.  Laboratory evaluation reveals normal WBC count, though elevated CRP.  Lactate presently normal, and patient not meeting criteria for severe sepsis nor septic shock.  Blood cultures x2 obtained and are presently pending.  The region of cellulitis may be secondary to superimposed bacterial infection following the insect bite.  Underlying Lyme's disease on the differential.  Lyme's testing will be sent from the ER, the results of which are presently pending.  He will likely benefit from ongoing treatment with doxycycline.  Remainder  of skin exam without evidence of erythema migrans, nor does patient have associated headache, neck stiffness, or AMS to suggest meningitis.  EKG demonstrates sinus rhythm without evidence of AV block to suggest Lyme carditis.  Given failure of outpatient treatment, patient will be started on Ancef, which has better strep coverage than oral doxycycline.  Patient has no prior history of MRSA, and wound is appeared nonpurulent.  Will require admission for response to therapy.  If patient fails to respond to additional IV antibiotics, may consider repeat imaging down the road.  Case discussed with Dr.The hospital service who has accepted the patient for admission.      MadelineBjornMonserrat M  5/20/2021 10:52 AM - Draft       []Hide copied text    []Kristina for details     History   Chief Complaint:  Insect Bite     The history is provided by the patient.      Gil Chase is a 65 year old male with history of type II diabetes and hypertension who presents with an insect bite. The patient [place] and began to feel pressure and pain around his umbilicus last Saturday, prompting him to be evaluated on Monday where he was given Doxycycline. He was seen earlier today by his primary care physician for worsening symptoms of pain, fatigue, and increasing redness around the umbilicus and was referred here. Denies fevers, chills, drainage from the umbilicus, joint pain, headaches, or nausea.      Review of Systems   Constitutional: Positive for fatigue. Negative for chills and fever.   Gastrointestinal: Negative for nausea.   Musculoskeletal: Negative for arthralgias.   Skin: Positive for color change and rash.   Neurological: Negative for headaches.   All other systems reviewed and are negative.     Allergies:  No known drug allergies     Medications:  Vibramycin  Adderall  Adderall XR  Prinvil, Zestril  Hydrochlorothiazide  Viagra  Metformin      Past Medical History:    Type II  diabetes  ADHD  Erectile dysfunction of organic origin  Hypogonadism male  Hypertension  Glucose intolerance  Sleep apnea  Benign neoplasm of colon  Obesity     Past Surgical History:    Vasectomy  Vasectomy, reversal   ORIF, left femur      Social History:  Presents alone. Denies smoking.            Tests Performed:   Labs Ordered and Resulted from Time of ED Arrival Up to the Time of Departure from the ED   BASIC METABOLIC PANEL - Abnormal; Notable for the following components:       Result Value    Potassium 3.2 (*)     Creatinine 1.41 (*)     GFR Estimate 51 (*)     GFR Estimate If Black 60 (*)     All other components within normal limits   CRP INFLAMMATION - Abnormal; Notable for the following components:    CRP Inflammation 15.7 (*)     All other components within normal limits   CBC WITH PLATELETS DIFFERENTIAL   LACTIC ACID WHOLE BLOOD   SARS-COV-2 (COVID-19) VIRUS RT-PCR   LYME DISEASE WIL WITH REFLEX TO WB SERUM   BLOOD CULTURE   BLOOD CULTURE     POC US SOFT TISSUE   Final Result   Limited Bedside ED Ultrasound of Soft Tissue Procedure Note:      PROCEDURE: PERFORMED BY: Dr. Carrillo Miles MD   INDICATIONS/SYMPTOM: Skin redness, evaluate for abscess, cellulitis or foreign body   PROBE: High frequency linear probe   BODY LOCATION: Soft tissue located on trunk       FINDINGS: Cobblestoning of soft tissue: present    Hypoechoic fluid (ie abscess) identified: absent       INTERPRETATION:  The soft tissue and muscle layers were evaluated.       Findings indicate cellulitis      IMAGE DOCUMENTATION: Images were archived to PACs system.             . Abnormal Results: see EMR.   Treatments provided:   Medications   ceFAZolin (ANCEF) intermittent infusion 2 g in 100 mL dextrose PRE-MIX (0 g Intravenous Stopped 5/20/21 1102)   potassium chloride ER (KLOR-CON M) CR tablet 40 mEq (40 mEq Oral Given 5/20/21 1020)       Family Comments: no family present  OBS brochure/video discussed/provided to patient:  Yes  ED  Medications:   Medications   ceFAZolin (ANCEF) intermittent infusion 2 g in 100 mL dextrose PRE-MIX (0 g Intravenous Stopped 5/20/21 1102)   potassium chloride ER (KLOR-CON M) CR tablet 40 mEq (40 mEq Oral Given 5/20/21 1020)     Drips infusing:  No  For the majority of the shift, the patient's behavior Green. Interventions performed were NA.    Sepsis treatment initiated: No     Patient tested for COVID 19 prior to admission: YES    ED Nurse Name/Phone Number: FARIDEH ALEJO RN,   11:03 AM    RECEIVING UNIT ED HANDOFF REVIEW    Above ED Nurse Handoff Report was reviewed: Yes  Reviewed by: Carolina Olmos RN on May 20, 2021 at 11:22 AM

## 2021-05-20 NOTE — PHARMACY-VANCOMYCIN DOSING SERVICE
Pharmacy Vancomycin Initial Note  Date of Service May 20, 2021  Patient's  1955  65 year old, male    Indication: Skin and Soft Tissue Infection    Current estimated CrCl = Estimated Creatinine Clearance: 58.2 mL/min (A) (based on SCr of 1.41 mg/dL (H)).    Creatinine for last 3 days  2021:  9:09 AM Creatinine 1.41 mg/dL    Recent Vancomycin Level(s) for last 3 days  No results found for requested labs within last 72 hours.      Vancomycin IV Administrations (past 72 hours)      No vancomycin orders with administrations in past 72 hours.                Nephrotoxins and other renal medications (From now, onward)    Start     Dose/Rate Route Frequency Ordered Stop    21 0800  lisinopril (ZESTRIL) tablet 10 mg      10 mg Oral DAILY 21 1320      21 1830  vancomycin 1500 mg in 0.9% NaCl 250 ml intermittent infusion 1,500 mg      1,500 mg  over 90 Minutes Intravenous EVERY 12 HOURS 21 1802            Contrast Orders - past 72 hours (72h ago, onward)    None                  Plan:  1. Start vancomycin  1500 mg IV qq12h.   2. Vancomycin monitoring method: Trough (Method 1 = dosing nomogram)  3. Vancomycin therapeutic monitoring goal: 10-15 mg/L  4. Pharmacy will check vancomycin levels as appropriate in 1-3 Days.    5. Serum creatinine levels will be ordered daily for the first week of therapy and at least twice weekly for subsequent weeks.      Kermit Severson, [facility]

## 2021-05-21 VITALS
RESPIRATION RATE: 16 BRPM | OXYGEN SATURATION: 100 % | HEART RATE: 71 BPM | HEIGHT: 68 IN | DIASTOLIC BLOOD PRESSURE: 65 MMHG | WEIGHT: 214 LBS | BODY MASS INDEX: 32.43 KG/M2 | SYSTOLIC BLOOD PRESSURE: 130 MMHG | TEMPERATURE: 96.6 F

## 2021-05-21 LAB
ANION GAP SERPL CALCULATED.3IONS-SCNC: 3 MMOL/L (ref 3–14)
BUN SERPL-MCNC: 17 MG/DL (ref 7–30)
CALCIUM SERPL-MCNC: 9.2 MG/DL (ref 8.5–10.1)
CHLORIDE SERPL-SCNC: 107 MMOL/L (ref 94–109)
CO2 SERPL-SCNC: 30 MMOL/L (ref 20–32)
CREAT SERPL-MCNC: 1.39 MG/DL (ref 0.66–1.25)
ERYTHROCYTE [DISTWIDTH] IN BLOOD BY AUTOMATED COUNT: 13.2 % (ref 10–15)
GFR SERPL CREATININE-BSD FRML MDRD: 52 ML/MIN/{1.73_M2}
GLUCOSE SERPL-MCNC: 99 MG/DL (ref 70–99)
HCT VFR BLD AUTO: 42.7 % (ref 40–53)
HGB BLD-MCNC: 14.6 G/DL (ref 13.3–17.7)
MCH RBC QN AUTO: 29.6 PG (ref 26.5–33)
MCHC RBC AUTO-ENTMCNC: 34.2 G/DL (ref 31.5–36.5)
MCV RBC AUTO: 86 FL (ref 78–100)
PLATELET # BLD AUTO: 203 10E9/L (ref 150–450)
POTASSIUM SERPL-SCNC: 3.3 MMOL/L (ref 3.4–5.3)
RBC # BLD AUTO: 4.94 10E12/L (ref 4.4–5.9)
SODIUM SERPL-SCNC: 140 MMOL/L (ref 133–144)
WBC # BLD AUTO: 9 10E9/L (ref 4–11)

## 2021-05-21 PROCEDURE — 99217 PR OBSERVATION CARE DISCHARGE: CPT | Performed by: PHYSICIAN ASSISTANT

## 2021-05-21 PROCEDURE — G0378 HOSPITAL OBSERVATION PER HR: HCPCS

## 2021-05-21 PROCEDURE — 258N000003 HC RX IP 258 OP 636: Performed by: HOSPITALIST

## 2021-05-21 PROCEDURE — 250N000013 HC RX MED GY IP 250 OP 250 PS 637: Performed by: PHYSICIAN ASSISTANT

## 2021-05-21 PROCEDURE — 36415 COLL VENOUS BLD VENIPUNCTURE: CPT | Performed by: PHYSICIAN ASSISTANT

## 2021-05-21 PROCEDURE — 85027 COMPLETE CBC AUTOMATED: CPT | Performed by: PHYSICIAN ASSISTANT

## 2021-05-21 PROCEDURE — 80048 BASIC METABOLIC PNL TOTAL CA: CPT | Performed by: PHYSICIAN ASSISTANT

## 2021-05-21 PROCEDURE — 250N000011 HC RX IP 250 OP 636: Performed by: HOSPITALIST

## 2021-05-21 PROCEDURE — 96376 TX/PRO/DX INJ SAME DRUG ADON: CPT

## 2021-05-21 RX ORDER — POTASSIUM CHLORIDE 1500 MG/1
40 TABLET, EXTENDED RELEASE ORAL ONCE
Status: COMPLETED | OUTPATIENT
Start: 2021-05-21 | End: 2021-05-21

## 2021-05-21 RX ORDER — LIDOCAINE HYDROCHLORIDE 10 MG/ML
10 INJECTION, SOLUTION EPIDURAL; INFILTRATION; INTRACAUDAL; PERINEURAL ONCE
Status: DISCONTINUED | OUTPATIENT
Start: 2021-05-21 | End: 2021-05-21 | Stop reason: HOSPADM

## 2021-05-21 RX ORDER — DOXYCYCLINE 100 MG/1
100 CAPSULE ORAL 2 TIMES DAILY
Qty: 16 CAPSULE | Refills: 0 | COMMUNITY
Start: 2021-05-21 | End: 2021-05-28

## 2021-05-21 RX ORDER — CEFADROXIL 500 MG/1
1000 CAPSULE ORAL EVERY 12 HOURS SCHEDULED
Status: DISCONTINUED | OUTPATIENT
Start: 2021-05-21 | End: 2021-05-21 | Stop reason: HOSPADM

## 2021-05-21 RX ORDER — CEFADROXIL 500 MG/1
1000 CAPSULE ORAL EVERY 12 HOURS SCHEDULED
Status: DISCONTINUED | OUTPATIENT
Start: 2021-05-21 | End: 2021-05-21

## 2021-05-21 RX ORDER — CEFADROXIL 500 MG/1
1000 CAPSULE ORAL 2 TIMES DAILY
Qty: 28 CAPSULE | Refills: 0 | Status: SHIPPED | OUTPATIENT
Start: 2021-05-21 | End: 2021-05-28

## 2021-05-21 RX ADMIN — LISINOPRIL 10 MG: 10 TABLET ORAL at 08:31

## 2021-05-21 RX ADMIN — POTASSIUM CHLORIDE 40 MEQ: 1500 TABLET, EXTENDED RELEASE ORAL at 08:57

## 2021-05-21 RX ADMIN — VANCOMYCIN HYDROCHLORIDE 1500 MG: 10 INJECTION, POWDER, LYOPHILIZED, FOR SOLUTION INTRAVENOUS at 06:15

## 2021-05-21 RX ADMIN — DOXYCYCLINE HYCLATE 100 MG: 100 CAPSULE ORAL at 08:31

## 2021-05-21 RX ADMIN — DEXTROAMPHETAMINE SACCHARATE, AMPHETAMINE ASPARTATE MONOHYDRATE, DEXTROAMPHETAMINE SULFATE, AND AMPHETAMINE SULFATE 30 MG: 7.5; 7.5; 7.5; 7.5 CAPSULE, EXTENDED RELEASE ORAL at 08:31

## 2021-05-21 NOTE — PLAN OF CARE
PRIMARY DIAGNOSIS: CELLULITIS   OUTPATIENT/OBSERVATION GOALS TO BE MET BEFORE DISCHARGE:  1. Vitals sign stable or return to baseline: Yes    2. Tolerating oral antibiotics or has home infusion set up if applicable:  IV Ancef     3. Pain status: Pain free.    4. Return to near baseline physical activity: Yes    Vitals are Temp: 97.9  F (36.6  C) Temp src: Oral BP: 131/78 Pulse: 67   Resp: 18 SpO2: 98 %.    Patient is Alert and Oriented x4. Denies any pain with interview. He is independent with no assistive devices in room. Pt is a Regular diet.  IV site is CDI with no s/sx of infection or infiltration. Patient is Saline locked. Cellulitis at abdomen around umbilicus and surrounding skin is red and warm to touch, not expanded beyond borders. Will continue to monitor.     Discharge Planner Nurse   Safe discharge environment identified: Yes  Barriers to discharge: No       Entered by: Jaye Hand 05/21/2021     Please review provider order for any additional goals.     Nurse to notify provider when observation goals have been met and patient is ready for discharge.

## 2021-05-21 NOTE — DISCHARGE SUMMARY
[facility]  Discharge Summary  Hospitalist    Date of Admission:  5/20/2021  Date of Discharge:  5/21/2021    Discharging Provider: Yessy Baum PAC    Discharge Diagnoses   1. Tick bite with associated cellulitis.  Discharging on Duricef (for cellulitis) and doxycycline (for Lyme's disease prophylaxis).   2. Hypertension    Discharge Disposition     Discharged to home    Condition at Discharge:  Stable    History of Present Illness   Gil Chase is an 65 year old male with DM2, hypertension, and ADHD who presented to [facility] ED on 5/20/2021 with increasing periumbilical redness and swelling.  Patient had been seen at outside facility for a tick bite.  It is believed that the tick been present in his bellybutton for several weeks before he found it.  He discussed at length developed redness, induration, and warmth around his bellybutton.  He was started on doxycycline but unfortunately the redness spread beyond the borders and was quite painful.  Patient came into the ER for further evaluation.    The ED, patient was hemodynamically stable.  Patient is afebrile and without leukocytosis.  Ultrasound the abdomen did not show any obvious fluid collection.  Patient started on Ancef.  Admission requested.    Patient remained afebrile during his stay.  He was dilated by General Surgery (Dr. Jett) who did not feel there was any need for surgical debridement as there was no obvious fluid collection.  Patient was also seen by Infectious Disease who recommended transition to Duricef for the associated cellulitis and continuation of doxycycline for Lyme's prophylaxis.  Area of redness stabilized and patient noted improvement in pain.  He was comfortable discharging home on the above antibiotics with outpatient primary care provider follow-up.      WOOD Geronimo    Pending Results   These results will be followed up by hospitalist  Unresulted Labs Ordered in the Past 30 Days of this Admission      Date and Time Order Name Status Description    5/20/2021 0911 Blood culture Preliminary     5/20/2021 0905 Blood culture Preliminary           Code Status   Full Code       Primary Care Physician   Physician No Ref-Primary    Consultations This Hospital Stay   SURGERY GENERAL IP CONSULT  PHARMACY TO DOSE VANCO  INFECTIOUS DISEASES IP CONSULT    Time Spent on this Encounter   I, WOOD Geronimo, personally saw the patient today and spent greater than 30 minutes discharging this patient.    Allergies   No Known Allergies    Physical Exam   Temp: 96.6  F (35.9  C) Temp src: Oral BP: 130/65 Pulse: 71   Resp: 16 SpO2: 100 % O2 Device: None (Room air)    Vitals:    05/20/21 1159   Weight: 97.1 kg (214 lb)     Vital Signs with Ranges  Temp:  [96.6  F (35.9  C)-98.6  F (37  C)] 96.6  F (35.9  C)  Pulse:  [66-75] 71  Resp:  [16-18] 16  BP: (125-145)/(65-78) 130/65  SpO2:  [97 %-100 %] 100 %  No intake/output data recorded.      GENERAL:  Pleasant, cooperative, alert. Well developed, well nourished.   HEENT: Normocephalic, atraumatic.  Extra occular mm intact.  Sclera clear. PERRL.  Mucous membranes moist.    PULMONOLOGY: Clear   CARDIAC: Regular    ABDOMEN: Soft, nontender.  Fairly large area of redness around the belly-button, about 8-10 inches in diameter that, per patient, has stabilized.  No fluctuance or bogginess.    MUSCULOSKELETAL:  Moving x 4 spontaneously with CMS intact x4.  Normal bulk and tone.    NEURO: Alert and oriented x3.  CN II-XII grossly intact and symmetric.  No ataxia or tremor.  Nonfocal.    Discharge Medications   Current Discharge Medication List      START taking these medications    Details   cefadroxil (DURICEF) 500 MG capsule Take 2 capsules (1,000 mg) by mouth 2 times daily for 7 days  Qty: 28 capsule, Refills: 0    Associated Diagnoses: Cellulitis of abdominal wall; Tick bite, initial encounter         CONTINUE these medications which have CHANGED    Details   doxycycline hyclate  (VIBRAMYCIN) 100 MG capsule Take 1 capsule (100 mg) by mouth 2 times daily X 8 more days  Qty: 16 capsule, Refills: 0         CONTINUE these medications which have NOT CHANGED    Details   amphetamine-dextroamphetamine (ADDERALL XR) 30 MG 24 hr capsule Take 30 mg by mouth daily      amphetamine-dextroamphetamine (ADDERALL) 20 MG tablet Take 20 mg by mouth daily      hydrochlorothiazide (HYDRODIURIL) 25 MG tablet Take 25 mg by mouth daily       lisinopril (PRINIVIL/ZESTRIL) 10 MG tablet Take 10 mg by mouth daily      SEMAGLUTIDE,0.25 OR 0.5MG/DOS, SC Inject 0.5 mg Subcutaneous once a week On Tuesdays             Data   Most Recent 3 CBC's:  Recent Labs   Lab Test 05/21/21  0634 05/20/21  0909   WBC 9.0 7.6   HGB 14.6 14.3   MCV 86 88    203      Most Recent 3 BMP's:  Recent Labs   Lab Test 05/21/21  0634 05/20/21  0909    139   POTASSIUM 3.3* 3.2*   CHLORIDE 107 104   CO2 30 28   BUN 17 20   CR 1.39* 1.41*   ANIONGAP 3 7   RACHEL 9.2 9.1   GLC 99 89     Most Recent 6 Bacteria Isolates From Any Culture (See EPIC Reports for Culture Details):  Recent Labs   Lab Test 05/20/21  0958 05/20/21  0909   CULT No growth after 15 hours No growth after 20 hours     Most Recent TSH, T4 and A1c Labs:No lab results found.  Results for orders placed or performed during the hospital encounter of 05/20/21   POC US SOFT TISSUE    Impression    Limited Bedside ED Ultrasound of Soft Tissue Procedure Note:    PROCEDURE: PERFORMED BY: Dr. Carrillo Miles MD  INDICATIONS/SYMPTOM: Skin redness, evaluate for abscess, cellulitis or foreign body  PROBE: High frequency linear probe  BODY LOCATION: Soft tissue located on trunk     FINDINGS: Cobblestoning of soft tissue: present   Hypoechoic fluid (ie abscess) identified: absent     INTERPRETATION:  The soft tissue and muscle layers were evaluated.      Findings indicate cellulitis    IMAGE DOCUMENTATION: Images were archived to PACs system.           Discharge Orders      Reason for  your hospital stay    You were brought in for evaluation of abdominal redness following tick bite.  You were seen by General Surgery who did not feel you needed any additional opening of the wound to assess for abscess.  You were also seen by Infectious Disease who recommends you continue with oral doxycycline for 8 more days in addition to another antibiotic called Duricef for 7 days.  Both medications are to be taken twice daily (ideally, every 12 hours).  Please follow-up with your PMD in 3-5 days for hospital follow-up.  Return to the ER if you develop fever, abnormal drainage from the tick bite, or any other concerning symptoms.     When to contact your care team    Call your primary doctor if you have any of the following: temperature greater than 101, worsening shortness of breath, increased swelling, worsening pain, new or unrelenting diarrhea, dizziness/passing out, falls, bleeding that doesn't stop, uncontrolled pain, loss of taste, loss of smell, or any other new or concerning symptoms.  Call 911 or go to the Emergency Room if you need immediate assistance.

## 2021-05-21 NOTE — PLAN OF CARE
PRIMARY DIAGNOSIS: CELLULITIS   OUTPATIENT/OBSERVATION GOALS TO BE MET BEFORE DISCHARGE:  1. Vitals sign stable or return to baseline: Yes    2. Tolerating oral antibiotics or has home infusion set up if applicable:  IV Ancef     3. Pain status: Pain free.    4. Return to near baseline physical activity: Yes    Vitals are Temp: 97.1  F (36.2  C) Temp src: Oral BP: 125/77 Pulse: 68   Resp: 16 SpO2: 98 %.    Patient is Alert and Oriented x4. Denies any pain with interview. He is independent with no assistive devices in room. Pt is a Regular diet but is been NPO since [facility].  IV site is CDI with no s/sx of infection or infiltration. Patient is Saline locked. Cellulitis at abdomen around umbilicus and surrounding skin is red and warm to touch, not expanded beyond borders. Tolerating antibiotic. Plan is General surgery consult. Continuing with supportive cares and symptom management.     Discharge Planner Nurse   Safe discharge environment identified: Yes  Barriers to discharge: No       Entered by: Jaye Hand 05/21/2021     Please review provider order for any additional goals.     Nurse to notify provider when observation goals have been met and patient is ready for discharge.

## 2021-05-21 NOTE — PLAN OF CARE
PRIMARY DIAGNOSIS: CELLULITIS   OUTPATIENT/OBSERVATION GOALS TO BE MET BEFORE DISCHARGE:  1. Vitals sign stable or return to baseline: Yes    2. Tolerating oral antibiotics or has home infusion set up if applicable:  IV Ancef     3. Pain status: Pain free.    4. Return to near baseline physical activity: Yes    Vitals are Temp: 98.5  F (36.9  C) Temp src: Oral BP: 129/75 Pulse: 74   Resp: 18 SpO2: 99 %.  Patient is Alert and Oriented x4. Denies any pain with interview. He is independent with no assistive devices in room. Pt is a Regular diet.  IV site is CDI with no s/sx of infection or infiltration.  Patient is Saline locked. Cellulitis at abdomen around umbilicus and surrounding skin is red and warm to touch, not expanded beyond borders. Will continue to monitor.     Discharge Planner Nurse   Safe discharge environment identified: Yes  Barriers to discharge: No       Entered by: Jaye Hand 05/20/2021     Please review provider order for any additional goals.     Nurse to notify provider when observation goals have been met and patient is ready for discharge.

## 2021-05-21 NOTE — CONSULTS
[facility]    Infectious Disease Consultation     Date of Admission:  5/20/2021  Date of Consult 05/21/21    Assessment :  1.Abdominal wall cellulitis following tick bite. Over all improving, redness is regressing. No evidence of deep infection on CT imaging. Etiology likely streptococcal. Doxycycline does not have good streptococcal coverage and it is likely cellulitis progressed on Doxycycline monotherapy.  2. Recent tick bite with tick removal on 5/18  3. DM / HTN    Recommendations  1. Treat with Doxycycline for a total of 10 days  2 Transition to oral antibiotics and discharge on Cefadroxil 1000 mg BID (for 5-7 days) + Doxycycline    ID will sign off , please call with questions as needed    Genna Martin MD    Reason for Consult    I was asked to evaluate this patient for antimicrobial recommendations for cellulitis.    Primary Care Physician   Physician No Ref-Primary    Chief Complaint   Abdominal wall rendness and swelling    History is obtained from the patient and medical records    History of Present Illness   Gil Chase is a 65 year old male with DM, HTN and ADHD who is hospitalized with abdominal wall redness and swelling following a tick bite on 5/18.    Patient noted redness around the umbilicus for several weeks. He was evaluated on 5/18 and had a tick removed from the umbilicus and was initiated on Doxycycline. He continued to have spreading redness and was subsequently hospitalized for admission.  He denies fever, chills or joint pains, no other rash. US abdomen did not show evidence of abscess formation. Patient has been seen by general surgery and has no indication for surgical debridement.    Patient has been maintained on Cefazolin, Vancomycin and Doxycycline, and ID has been asked to assist with further antibiotic management.    Labs demonstrate stable WBC of 9K and markedly elevated CRP of 15.7. Blood cultures have remained negative.    Antimicrobial therapy  5/20  Doxycycline  5/20 Cefazolin  5/20 Vancomycin    Past Medical History   I have reviewed this patient's medical history and updated it with pertinent information if needed.   No past medical history on file.    Past Surgical History   I have reviewed this patient's surgical history and updated it with pertinent information if needed.  Past Surgical History:   Procedure Laterality Date     AR REV VASO-VASECTOMY ADJ       VASECTOMY      pt has vas reversal     Prior to Admission Medications   Prior to Admission Medications   Prescriptions Last Dose Informant Patient Reported? Taking?   SEMAGLUTIDE,0.25 OR 0.5MG/DOS, SC 5/18/2021  Yes Yes   Sig: Inject 0.5 mg Subcutaneous once a week On Tuesdays   amphetamine-dextroamphetamine (ADDERALL XR) 30 MG 24 hr capsule 5/20/2021 at am  Yes Yes   Sig: Take 30 mg by mouth daily   amphetamine-dextroamphetamine (ADDERALL) 20 MG tablet 5/19/2021 at Unknown time  Yes Yes   Sig: Take 20 mg by mouth daily   doxycycline hyclate (VIBRAMYCIN) 100 MG capsule 5/20/2021 at am  Yes Yes   Sig: Take 100 mg by mouth 2 times daily X14 days   hydrochlorothiazide (HYDRODIURIL) 25 MG tablet 5/20/2021 at am  Yes Yes   Sig: Take 25 mg by mouth daily    lisinopril (PRINIVIL/ZESTRIL) 10 MG tablet 5/20/2021 at am  Yes Yes   Sig: Take 10 mg by mouth daily      Facility-Administered Medications: None     Allergies   No Known Allergies    Immunization History   Immunization History   Administered Date(s) Administered     COVID-19,PF,Moderna 04/09/2021, 05/07/2021       Social History   I have reviewed this patient's social history and updated it with pertinent information if needed. Gil Chase  reports that he has never smoked. He has never used smokeless tobacco. He reports current alcohol use.    Family History   I have reviewed this patient's family history and updated it with pertinent information if needed.   No family history on file.    Review of Systems   The 10 point Review of Systems is negative  other than noted in the HPI or here.     Physical Exam   Temp: 96.9  F (36.1  C) Temp src: Oral BP: 128/76 Pulse: 66   Resp: 16 SpO2: 97 % O2 Device: None (Room air)    Vital Signs with Ranges  Temp:  [96.2  F (35.7  C)-98.6  F (37  C)] 96.9  F (36.1  C)  Pulse:  [66-75] 66  Resp:  [16-18] 16  BP: (125-145)/(75-78) 128/76  SpO2:  [97 %-100 %] 97 %  214 lbs 0 oz  Body mass index is 32.54 kg/m .    GENERAL APPEARANCE:  awake  EYES: Eyes grossly normal to inspection, PERRL and conjunctivae and sclerae normal  NECK: no adenopathy, no asymmetry, masses, or scars and thyroid normal to palpation  RESP: lungs clear to auscultation - no rales, rhonchi or wheezes  CV: regular rates and rhythm, normal S1 S2, no S3 or S4 and no murmur, click or rub  ABDOMEN: Abdominal wall erythema regressing  MS: extremities normal- no gross deformities noted  SKIN: no suspicious lesions or rashes      Data   All laboratory data reviewed  Recent Labs   Lab 05/20/21  0958 05/20/21  0909   CULT No growth after 15 hours No growth after 20 hours     Recent Labs   Lab Test 05/20/21  0958 05/20/21  0909   CULT No growth after 15 hours No growth after 20 hours     Imaging  5/19 US soft tissue  Limited Bedside ED Ultrasound of Soft Tissue Procedure Note:     PROCEDURE: PERFORMED BY: Dr. Carrillo Miles MD   INDICATIONS/SYMPTOM: Skin redness, evaluate for abscess, cellulitis or foreign body   PROBE: High frequency linear probe   BODY LOCATION: Soft tissue located on trunk       FINDINGS: Cobblestoning of soft tissue: present   Hypoechoic fluid (ie abscess) identified: absent     INTERPRETATION:  The soft tissue and muscle layers were evaluated.     Findings indicate cellulitis     IMAGE DOCUMENTATION: Images were archived to PACs system

## 2021-05-21 NOTE — PROGRESS NOTES
[facility]    General Surgery Progress Note          Assessment and Plan:   Assessment:    Gil Chase is a 65 year old male admitted with abdominal cellulitis secondary to tick bite with removal of tick on 5/17/2021  - on doxycyline for lyme's prophylaxis       Plan:   - continue Doxy, Vanco  - Cover umbilicus with 2x2 dry gauze to contain any drainage and keep area clean  - ID consult for abx recommendations  - will discuss with Dr Jett any needs for surgical debridement.            Interval History:   Pt resting in bed.  No complaints.  Feels better than admission.  States never really felt that bad prior.  Denies fevers, chills, nausea/vomiting.           Physical Exam:   Temp: 96.9  F (36.1  C) Temp src: Oral BP: 128/76 Pulse: 66   Resp: 16   SpO2: 97 % O2 Device: None (Room air)        No intake/output data recorded.    Constitutional: healthy, alert and no distress   Abdomen: Erythema surrounding umbilicus.  Redness within lines outlined from yesterday.  Indurated tissue surrounding umbilicus.  Small area of excortication/healing inside umbilicus most likely area where tick was removed.  Scant serous drainage in area.   Umbilicus was probed with Qtip with no increase in pain, unable to elicit any drainage.            Data:   Data   Recent Labs   Lab 05/21/21  0634 05/20/21  0909   WBC 9.0 7.6   HGB 14.6 14.3   MCV 86 88    203    139   POTASSIUM 3.3* 3.2*   CHLORIDE 107 104   CO2 30 28   BUN 17 20   CR 1.39* 1.41*   ANIONGAP 3 7   RACHEL 9.2 9.1   GLC 99 89        Kavitha Emanuel PA-C

## 2021-05-21 NOTE — UTILIZATION REVIEW
"[facility]     Admission Status; Secondary Review Determination     Admission Date: 5/20/2021  8:38 AM      Under the authority of the Utilization Management Committee, the utilization review process indicated a secondary review on the above patient.  The review outcome is based on review of the medical records, discussions with staff, and applying clinical experience noted on the date of the review.          (x) Observation Status Appropriate - This patient does not meet hospital inpatient criteria and is placed in observation status. If this patient's primary payer is Medicare and was admitted as an inpatient, Condition Code 44 should be used and patient status changed to \"observation\".     RATIONALE FOR DETERMINATION   65-year-old male with a history of diabetes, hypertension, and ADHD was admitted yesterday with abdominal wall cellulitis from a tick bite. He was started on doxycycline and vancomycin. Infectious disease and general surgery have been consulted. He is not septic nor on IV fluids. No plans for operative intervention. At the time of this review the patient does not meet medical necessity for inpatient hospitalization and observation status is recommended.    The severity of illness, intensity of service provided, expected LOS and risk for adverse outcome make the care appropriate for further observation; however, doesn't meet criteria for hospital inpatient admission.  Yessy MUIR notified of this determination.        The information on this document is developed by the utilization review team in order for the business office to ensure compliance.  This only denotes the appropriateness of proper admission status and does not reflect the quality of care rendered.         The definitions of Inpatient Status and Observation Status used in making the determination above are those provided in the CMS Coverage Manual, Chapter 1 and Chapter 6, section 70.4.      Sincerely,     Marquis Dallas DO " MPH   Physician Advisor  Utilization Review  [facility]

## 2021-05-21 NOTE — PLAN OF CARE
"Patient's After Visit Summary was reviewed with patient.   Patient verbalized understanding of After Visit Summary, recommended follow up and was given an opportunity to ask questions.   Discharge medications sent home with patient/family: Cefadroxil    Discharged with nurse with all belongings. Patient medically cleared. Denied pain. Patient will transport self home.   /65 (BP Location: Right arm)   Pulse 71   Temp 96.6  F (35.9  C) (Oral)   Resp 16   Ht 1.727 m (5' 8\")   Wt 97.1 kg (214 lb)   SpO2 100%   BMI 32.54 kg/m    OBSERVATION patient END time: 1315        "

## 2021-05-21 NOTE — PLAN OF CARE
"PRIMARY DIAGNOSIS: CELLULITIS   OUTPATIENT/OBSERVATION GOALS TO BE MET BEFORE DISCHARGE:  Vitals sign stable or return to baseline: Yes     Tolerating oral antibiotics or has home infusion set up if applicable:  IV vanco and doxy     Pain status: Pain free.     Return to near baseline physical activity: Yes     /76 (BP Location: Right arm)   Pulse 66   Temp 96.9  F (36.1  C) (Oral)   Resp 16   Ht 1.727 m (5' 8\")   Wt 97.1 kg (214 lb)   SpO2 97%   BMI 32.54 kg/m      Vitals stable. Pt alert and oriented x4. Independently moving. Denies pain. Redness increasing laterally on abdomen, outlined. On IV vanco and doxy. Potassium replaced. NPO for surgery consult. ID consulted. Patient resting comfortably. Will continue to monitor and provide supportive cares.     Discharge Planner Nurse   Safe discharge environment identified: Yes  Barriers to discharge: Not once medically cleared.        Entered by: dave thompson RN  Please review provider order for any additional goals.      Nurse to notify provider when observation goals have been met and patient is ready for discharge.        "

## 2021-05-26 LAB
BACTERIA SPEC CULT: NO GROWTH
BACTERIA SPEC CULT: NO GROWTH
SPECIMEN SOURCE: NORMAL
SPECIMEN SOURCE: NORMAL

## 2021-07-19 ENCOUNTER — HOSPITAL ENCOUNTER (INPATIENT)
Facility: CLINIC | Age: 66
LOS: 3 days | Discharge: HOME | DRG: 683 | End: 2021-07-22
Attending: EMERGENCY MEDICINE | Admitting: INTERNAL MEDICINE
Payer: COMMERCIAL

## 2021-07-19 ENCOUNTER — APPOINTMENT (OUTPATIENT)
Dept: CT IMAGING | Facility: CLINIC | Age: 66
DRG: 683 | End: 2021-07-19
Attending: EMERGENCY MEDICINE
Payer: COMMERCIAL

## 2021-07-19 DIAGNOSIS — N40.1 BENIGN PROSTATIC HYPERPLASIA WITH INCOMPLETE BLADDER EMPTYING: Primary | ICD-10-CM

## 2021-07-19 DIAGNOSIS — R39.14 BENIGN PROSTATIC HYPERPLASIA WITH INCOMPLETE BLADDER EMPTYING: Primary | ICD-10-CM

## 2021-07-19 DIAGNOSIS — N13.30 BILATERAL HYDRONEPHROSIS: ICD-10-CM

## 2021-07-19 DIAGNOSIS — R33.8 ACUTE URINARY RETENTION: ICD-10-CM

## 2021-07-19 DIAGNOSIS — N17.9 ACUTE RENAL FAILURE, UNSPECIFIED ACUTE RENAL FAILURE TYPE (H): ICD-10-CM

## 2021-07-19 LAB
ALBUMIN UR-MCNC: NEGATIVE MG/DL
ANION GAP SERPL CALCULATED.3IONS-SCNC: 7 MMOL/L (ref 3–14)
APPEARANCE UR: CLEAR
BACTERIA #/AREA URNS HPF: ABNORMAL /HPF
BASOPHILS # BLD AUTO: 0.1 10E3/UL (ref 0–0.2)
BASOPHILS NFR BLD AUTO: 1 %
BILIRUB UR QL STRIP: NEGATIVE
BUN SERPL-MCNC: 55 MG/DL (ref 7–30)
CALCIUM SERPL-MCNC: 9.3 MG/DL (ref 8.5–10.1)
CHLORIDE BLD-SCNC: 106 MMOL/L (ref 94–109)
CO2 SERPL-SCNC: 29 MMOL/L (ref 20–32)
COLOR UR AUTO: ABNORMAL
CREAT SERPL-MCNC: 4.63 MG/DL (ref 0.66–1.25)
CREAT UR-MCNC: 67 MG/DL
EOSINOPHIL # BLD AUTO: 0.3 10E3/UL (ref 0–0.7)
EOSINOPHIL NFR BLD AUTO: 4 %
ERYTHROCYTE [DISTWIDTH] IN BLOOD BY AUTOMATED COUNT: 12.5 % (ref 10–15)
FRACT EXCRET NA UR+SERPL-RTO: 2.1 %
GFR SERPL CREATININE-BSD FRML MDRD: 12 ML/MIN/1.73M2
GLUCOSE BLD-MCNC: 85 MG/DL (ref 70–99)
GLUCOSE UR STRIP-MCNC: NEGATIVE MG/DL
HCT VFR BLD AUTO: 35.8 % (ref 40–53)
HGB BLD-MCNC: 12 G/DL (ref 13.3–17.7)
HGB UR QL STRIP: NEGATIVE
HOLD SPECIMEN: NORMAL
IMM GRANULOCYTES # BLD: 0 10E3/UL
IMM GRANULOCYTES NFR BLD: 0 %
KETONES UR STRIP-MCNC: NEGATIVE MG/DL
LEUKOCYTE ESTERASE UR QL STRIP: ABNORMAL
LYMPHOCYTES # BLD AUTO: 1.3 10E3/UL (ref 0.8–5.3)
LYMPHOCYTES NFR BLD AUTO: 19 %
MCH RBC QN AUTO: 29.9 PG (ref 26.5–33)
MCHC RBC AUTO-ENTMCNC: 33.5 G/DL (ref 31.5–36.5)
MCV RBC AUTO: 89 FL (ref 78–100)
MONOCYTES # BLD AUTO: 0.4 10E3/UL (ref 0–1.3)
MONOCYTES NFR BLD AUTO: 7 %
NEUTROPHILS # BLD AUTO: 4.7 10E3/UL (ref 1.6–8.3)
NEUTROPHILS NFR BLD AUTO: 69 %
NITRATE UR QL: NEGATIVE
NRBC # BLD AUTO: 0 10E3/UL
NRBC BLD AUTO-RTO: 0 /100
PH UR STRIP: 5.5 [PH] (ref 5–7)
PLATELET # BLD AUTO: 212 10E3/UL (ref 150–450)
POTASSIUM BLD-SCNC: 4 MMOL/L (ref 3.4–5.3)
RBC # BLD AUTO: 4.02 10E6/UL (ref 4.4–5.9)
RBC URINE: <1 /HPF
SARS-COV-2 RNA RESP QL NAA+PROBE: NEGATIVE
SODIUM SERPL-SCNC: 142 MMOL/L (ref 133–144)
SODIUM UR-SCNC: 44 MMOL/L
SP GR UR STRIP: 1.01 (ref 1–1.03)
UROBILINOGEN UR STRIP-MCNC: NORMAL MG/DL
WBC # BLD AUTO: 6.8 10E3/UL (ref 4–11)
WBC URINE: 2 /HPF

## 2021-07-19 PROCEDURE — 74176 CT ABD & PELVIS W/O CONTRAST: CPT

## 2021-07-19 PROCEDURE — 87086 URINE CULTURE/COLONY COUNT: CPT | Performed by: EMERGENCY MEDICINE

## 2021-07-19 PROCEDURE — 258N000003 HC RX IP 258 OP 636: Performed by: EMERGENCY MEDICINE

## 2021-07-19 PROCEDURE — 80048 BASIC METABOLIC PNL TOTAL CA: CPT | Performed by: EMERGENCY MEDICINE

## 2021-07-19 PROCEDURE — 36415 COLL VENOUS BLD VENIPUNCTURE: CPT | Performed by: EMERGENCY MEDICINE

## 2021-07-19 PROCEDURE — 120N000001 HC R&B MED SURG/OB

## 2021-07-19 PROCEDURE — 85025 COMPLETE CBC W/AUTO DIFF WBC: CPT | Performed by: EMERGENCY MEDICINE

## 2021-07-19 PROCEDURE — 51702 INSERT TEMP BLADDER CATH: CPT

## 2021-07-19 PROCEDURE — 82570 ASSAY OF URINE CREATININE: CPT | Performed by: EMERGENCY MEDICINE

## 2021-07-19 PROCEDURE — 82374 ASSAY BLOOD CARBON DIOXIDE: CPT | Performed by: EMERGENCY MEDICINE

## 2021-07-19 PROCEDURE — 99285 EMERGENCY DEPT VISIT HI MDM: CPT | Mod: 25

## 2021-07-19 PROCEDURE — 84300 ASSAY OF URINE SODIUM: CPT | Performed by: EMERGENCY MEDICINE

## 2021-07-19 PROCEDURE — C9803 HOPD COVID-19 SPEC COLLECT: HCPCS

## 2021-07-19 PROCEDURE — 99223 1ST HOSP IP/OBS HIGH 75: CPT | Mod: AI | Performed by: INTERNAL MEDICINE

## 2021-07-19 PROCEDURE — 36592 COLLECT BLOOD FROM PICC: CPT | Performed by: EMERGENCY MEDICINE

## 2021-07-19 PROCEDURE — 96361 HYDRATE IV INFUSION ADD-ON: CPT

## 2021-07-19 PROCEDURE — 81001 URINALYSIS AUTO W/SCOPE: CPT | Performed by: EMERGENCY MEDICINE

## 2021-07-19 PROCEDURE — 87635 SARS-COV-2 COVID-19 AMP PRB: CPT | Performed by: EMERGENCY MEDICINE

## 2021-07-19 RX ORDER — LIDOCAINE HYDROCHLORIDE 20 MG/ML
JELLY TOPICAL
Status: DISCONTINUED
Start: 2021-07-19 | End: 2021-07-20 | Stop reason: HOSPADM

## 2021-07-19 RX ORDER — TADALAFIL 5 MG/1
5 TABLET ORAL EVERY 24 HOURS
COMMUNITY

## 2021-07-19 RX ADMIN — SODIUM CHLORIDE 1000 ML: 9 INJECTION, SOLUTION INTRAVENOUS at 21:01

## 2021-07-19 ASSESSMENT — ENCOUNTER SYMPTOMS
DYSURIA: 1
DIARRHEA: 0
NAUSEA: 0
DIFFICULTY URINATING: 1
VOMITING: 0
FEVER: 0
SHORTNESS OF BREATH: 0
ABDOMINAL PAIN: 1

## 2021-07-19 NOTE — ED TRIAGE NOTES
Pt had lab work done 3 days ago due to dysuria and frequency.  Blood work shows his he has an elevated cr. At 4.1

## 2021-07-20 DIAGNOSIS — R97.20 ELEVATED PROSTATE SPECIFIC ANTIGEN (PSA): Primary | ICD-10-CM

## 2021-07-20 DIAGNOSIS — N40.1 BENIGN PROSTATIC HYPERPLASIA WITH URINARY RETENTION: ICD-10-CM

## 2021-07-20 DIAGNOSIS — R33.8 BENIGN PROSTATIC HYPERPLASIA WITH URINARY RETENTION: ICD-10-CM

## 2021-07-20 LAB
ANION GAP SERPL CALCULATED.3IONS-SCNC: 8 MMOL/L (ref 3–14)
BUN SERPL-MCNC: 51 MG/DL (ref 7–30)
CALCIUM SERPL-MCNC: 9.1 MG/DL (ref 8.5–10.1)
CHLORIDE BLD-SCNC: 107 MMOL/L (ref 94–109)
CO2 SERPL-SCNC: 26 MMOL/L (ref 20–32)
CREAT SERPL-MCNC: 3.89 MG/DL (ref 0.66–1.25)
ERYTHROCYTE [DISTWIDTH] IN BLOOD BY AUTOMATED COUNT: 12.5 % (ref 10–15)
GFR SERPL CREATININE-BSD FRML MDRD: 15 ML/MIN/1.73M2
GLUCOSE BLD-MCNC: 98 MG/DL (ref 70–99)
GLUCOSE BLDC GLUCOMTR-MCNC: 103 MG/DL (ref 70–99)
GLUCOSE BLDC GLUCOMTR-MCNC: 104 MG/DL (ref 70–99)
GLUCOSE BLDC GLUCOMTR-MCNC: 112 MG/DL (ref 70–99)
GLUCOSE BLDC GLUCOMTR-MCNC: 61 MG/DL (ref 70–99)
GLUCOSE BLDC GLUCOMTR-MCNC: 69 MG/DL (ref 70–99)
GLUCOSE BLDC GLUCOMTR-MCNC: 80 MG/DL (ref 70–99)
GLUCOSE BLDC GLUCOMTR-MCNC: 80 MG/DL (ref 70–99)
GLUCOSE BLDC GLUCOMTR-MCNC: 81 MG/DL (ref 70–99)
GLUCOSE BLDC GLUCOMTR-MCNC: 86 MG/DL (ref 70–99)
HBA1C MFR BLD: 5.2 % (ref 0–5.6)
HCT VFR BLD AUTO: 37 % (ref 40–53)
HGB BLD-MCNC: 12.3 G/DL (ref 13.3–17.7)
MCH RBC QN AUTO: 29.9 PG (ref 26.5–33)
MCHC RBC AUTO-ENTMCNC: 33.2 G/DL (ref 31.5–36.5)
MCV RBC AUTO: 90 FL (ref 78–100)
PLATELET # BLD AUTO: 186 10E3/UL (ref 150–450)
POTASSIUM BLD-SCNC: 3.8 MMOL/L (ref 3.4–5.3)
RBC # BLD AUTO: 4.12 10E6/UL (ref 4.4–5.9)
SODIUM SERPL-SCNC: 141 MMOL/L (ref 133–144)
WBC # BLD AUTO: 7.6 10E3/UL (ref 4–11)

## 2021-07-20 PROCEDURE — 120N000001 HC R&B MED SURG/OB

## 2021-07-20 PROCEDURE — 99207 PR NO BILLABLE SERVICE THIS VISIT: CPT | Performed by: PHYSICIAN ASSISTANT

## 2021-07-20 PROCEDURE — 36415 COLL VENOUS BLD VENIPUNCTURE: CPT | Performed by: INTERNAL MEDICINE

## 2021-07-20 PROCEDURE — 99233 SBSQ HOSP IP/OBS HIGH 50: CPT | Performed by: INTERNAL MEDICINE

## 2021-07-20 PROCEDURE — 250N000013 HC RX MED GY IP 250 OP 250 PS 637: Performed by: INTERNAL MEDICINE

## 2021-07-20 PROCEDURE — 250N000013 HC RX MED GY IP 250 OP 250 PS 637: Performed by: PHYSICIAN ASSISTANT

## 2021-07-20 PROCEDURE — 96374 THER/PROPH/DIAG INJ IV PUSH: CPT

## 2021-07-20 PROCEDURE — 250N000011 HC RX IP 250 OP 636: Performed by: INTERNAL MEDICINE

## 2021-07-20 PROCEDURE — 80048 BASIC METABOLIC PNL TOTAL CA: CPT | Performed by: INTERNAL MEDICINE

## 2021-07-20 PROCEDURE — 83036 HEMOGLOBIN GLYCOSYLATED A1C: CPT | Performed by: INTERNAL MEDICINE

## 2021-07-20 PROCEDURE — 96375 TX/PRO/DX INJ NEW DRUG ADDON: CPT

## 2021-07-20 PROCEDURE — 250N000011 HC RX IP 250 OP 636: Performed by: EMERGENCY MEDICINE

## 2021-07-20 PROCEDURE — 85027 COMPLETE CBC AUTOMATED: CPT | Performed by: INTERNAL MEDICINE

## 2021-07-20 RX ORDER — PROCHLORPERAZINE MALEATE 5 MG
5 TABLET ORAL EVERY 6 HOURS PRN
Status: DISCONTINUED | OUTPATIENT
Start: 2021-07-20 | End: 2021-07-22 | Stop reason: HOSPADM

## 2021-07-20 RX ORDER — HYDRALAZINE HYDROCHLORIDE 20 MG/ML
10 INJECTION INTRAMUSCULAR; INTRAVENOUS EVERY 6 HOURS PRN
Status: DISCONTINUED | OUTPATIENT
Start: 2021-07-20 | End: 2021-07-22 | Stop reason: HOSPADM

## 2021-07-20 RX ORDER — ONDANSETRON 2 MG/ML
4 INJECTION INTRAMUSCULAR; INTRAVENOUS EVERY 6 HOURS PRN
Status: DISCONTINUED | OUTPATIENT
Start: 2021-07-20 | End: 2021-07-22 | Stop reason: HOSPADM

## 2021-07-20 RX ORDER — NALOXONE HYDROCHLORIDE 0.4 MG/ML
0.2 INJECTION, SOLUTION INTRAMUSCULAR; INTRAVENOUS; SUBCUTANEOUS
Status: DISCONTINUED | OUTPATIENT
Start: 2021-07-20 | End: 2021-07-22 | Stop reason: HOSPADM

## 2021-07-20 RX ORDER — LORAZEPAM 2 MG/ML
0.5 INJECTION INTRAMUSCULAR ONCE
Status: COMPLETED | OUTPATIENT
Start: 2021-07-20 | End: 2021-07-20

## 2021-07-20 RX ORDER — ACETAMINOPHEN 325 MG/1
650 TABLET ORAL EVERY 6 HOURS PRN
Status: DISCONTINUED | OUTPATIENT
Start: 2021-07-20 | End: 2021-07-22 | Stop reason: HOSPADM

## 2021-07-20 RX ORDER — MORPHINE SULFATE 4 MG/ML
4 INJECTION, SOLUTION INTRAMUSCULAR; INTRAVENOUS ONCE
Status: COMPLETED | OUTPATIENT
Start: 2021-07-20 | End: 2021-07-20

## 2021-07-20 RX ORDER — DEXTROAMPHETAMINE SACCHARATE, AMPHETAMINE ASPARTATE MONOHYDRATE, DEXTROAMPHETAMINE SULFATE AND AMPHETAMINE SULFATE 7.5; 7.5; 7.5; 7.5 MG/1; MG/1; MG/1; MG/1
30 CAPSULE, EXTENDED RELEASE ORAL DAILY
Status: DISCONTINUED | OUTPATIENT
Start: 2021-07-20 | End: 2021-07-22 | Stop reason: HOSPADM

## 2021-07-20 RX ORDER — TAMSULOSIN HYDROCHLORIDE 0.4 MG/1
0.4 CAPSULE ORAL DAILY
Status: DISCONTINUED | OUTPATIENT
Start: 2021-07-20 | End: 2021-07-22 | Stop reason: HOSPADM

## 2021-07-20 RX ORDER — LIDOCAINE 40 MG/G
CREAM TOPICAL
Status: DISCONTINUED | OUTPATIENT
Start: 2021-07-20 | End: 2021-07-22 | Stop reason: HOSPADM

## 2021-07-20 RX ORDER — NICOTINE POLACRILEX 4 MG
15-30 LOZENGE BUCCAL
Status: DISCONTINUED | OUTPATIENT
Start: 2021-07-20 | End: 2021-07-22 | Stop reason: HOSPADM

## 2021-07-20 RX ORDER — ACETAMINOPHEN 650 MG/1
650 SUPPOSITORY RECTAL EVERY 6 HOURS PRN
Status: DISCONTINUED | OUTPATIENT
Start: 2021-07-20 | End: 2021-07-22 | Stop reason: HOSPADM

## 2021-07-20 RX ORDER — HYDROMORPHONE HYDROCHLORIDE 1 MG/ML
0.3 INJECTION, SOLUTION INTRAMUSCULAR; INTRAVENOUS; SUBCUTANEOUS
Status: DISCONTINUED | OUTPATIENT
Start: 2021-07-20 | End: 2021-07-21

## 2021-07-20 RX ORDER — METOPROLOL TARTRATE 25 MG/1
25 TABLET, FILM COATED ORAL 2 TIMES DAILY
Status: DISCONTINUED | OUTPATIENT
Start: 2021-07-20 | End: 2021-07-22 | Stop reason: HOSPADM

## 2021-07-20 RX ORDER — NALOXONE HYDROCHLORIDE 0.4 MG/ML
0.4 INJECTION, SOLUTION INTRAMUSCULAR; INTRAVENOUS; SUBCUTANEOUS
Status: DISCONTINUED | OUTPATIENT
Start: 2021-07-20 | End: 2021-07-22 | Stop reason: HOSPADM

## 2021-07-20 RX ORDER — PROCHLORPERAZINE 25 MG
12.5 SUPPOSITORY, RECTAL RECTAL EVERY 12 HOURS PRN
Status: DISCONTINUED | OUTPATIENT
Start: 2021-07-20 | End: 2021-07-22 | Stop reason: HOSPADM

## 2021-07-20 RX ORDER — DEXTROAMPHETAMINE SACCHARATE, AMPHETAMINE ASPARTATE, DEXTROAMPHETAMINE SULFATE AND AMPHETAMINE SULFATE 2.5; 2.5; 2.5; 2.5 MG/1; MG/1; MG/1; MG/1
20 TABLET ORAL DAILY
Status: DISCONTINUED | OUTPATIENT
Start: 2021-07-20 | End: 2021-07-22 | Stop reason: HOSPADM

## 2021-07-20 RX ORDER — ONDANSETRON 4 MG/1
4 TABLET, ORALLY DISINTEGRATING ORAL EVERY 6 HOURS PRN
Status: DISCONTINUED | OUTPATIENT
Start: 2021-07-20 | End: 2021-07-22 | Stop reason: HOSPADM

## 2021-07-20 RX ORDER — CLONIDINE HYDROCHLORIDE 0.1 MG/1
0.1 TABLET ORAL 2 TIMES DAILY
Status: DISCONTINUED | OUTPATIENT
Start: 2021-07-20 | End: 2021-07-20

## 2021-07-20 RX ORDER — DEXTROSE MONOHYDRATE 25 G/50ML
25-50 INJECTION, SOLUTION INTRAVENOUS
Status: DISCONTINUED | OUTPATIENT
Start: 2021-07-20 | End: 2021-07-22 | Stop reason: HOSPADM

## 2021-07-20 RX ADMIN — HYDROMORPHONE HYDROCHLORIDE 0.3 MG: 1 INJECTION, SOLUTION INTRAMUSCULAR; INTRAVENOUS; SUBCUTANEOUS at 13:33

## 2021-07-20 RX ADMIN — MORPHINE SULFATE 4 MG: 4 INJECTION INTRAVENOUS at 01:20

## 2021-07-20 RX ADMIN — METOPROLOL TARTRATE 25 MG: 25 TABLET, FILM COATED ORAL at 18:59

## 2021-07-20 RX ADMIN — HYDROMORPHONE HYDROCHLORIDE 0.3 MG: 1 INJECTION, SOLUTION INTRAMUSCULAR; INTRAVENOUS; SUBCUTANEOUS at 08:29

## 2021-07-20 RX ADMIN — Medication 2.5 MG: at 19:44

## 2021-07-20 RX ADMIN — HYDROMORPHONE HYDROCHLORIDE 0.3 MG: 1 INJECTION, SOLUTION INTRAMUSCULAR; INTRAVENOUS; SUBCUTANEOUS at 16:46

## 2021-07-20 RX ADMIN — HYDROMORPHONE HYDROCHLORIDE 0.3 MG: 1 INJECTION, SOLUTION INTRAMUSCULAR; INTRAVENOUS; SUBCUTANEOUS at 21:34

## 2021-07-20 RX ADMIN — ACETAMINOPHEN 650 MG: 325 TABLET, FILM COATED ORAL at 20:38

## 2021-07-20 RX ADMIN — DEXTROAMPHETAMINE SACCHARATE, AMPHETAMINE ASPARTATE, DEXTROAMPHETAMINE SULFATE, AND AMPHETAMINE SULFATE 20 MG: 2.5; 2.5; 2.5; 2.5 TABLET ORAL at 16:55

## 2021-07-20 RX ADMIN — Medication 2.5 MG: at 04:29

## 2021-07-20 RX ADMIN — DEXTROAMPHETAMINE SACCHARATE, AMPHETAMINE ASPARTATE MONOHYDRATE, DEXTROAMPHETAMINE SULFATE, AND AMPHETAMINE SULFATE 30 MG: 7.5; 7.5; 7.5; 7.5 CAPSULE, EXTENDED RELEASE ORAL at 11:54

## 2021-07-20 RX ADMIN — HYDROMORPHONE HYDROCHLORIDE 0.3 MG: 1 INJECTION, SOLUTION INTRAMUSCULAR; INTRAVENOUS; SUBCUTANEOUS at 10:35

## 2021-07-20 RX ADMIN — TAMSULOSIN HYDROCHLORIDE 0.4 MG: 0.4 CAPSULE ORAL at 10:36

## 2021-07-20 RX ADMIN — LORAZEPAM 0.5 MG: 2 INJECTION INTRAMUSCULAR; INTRAVENOUS at 01:18

## 2021-07-20 ASSESSMENT — ACTIVITIES OF DAILY LIVING (ADL)
WALKING_OR_CLIMBING_STAIRS_DIFFICULTY: NO
CONCENTRATING,_REMEMBERING_OR_MAKING_DECISIONS_DIFFICULTY: NO
DIFFICULTY_EATING/SWALLOWING: NO
DRESSING/BATHING_DIFFICULTY: NO
DOING_ERRANDS_INDEPENDENTLY_DIFFICULTY: NO
PATIENT_/_FAMILY_COMMUNICATION_STYLE: SPOKEN LANGUAGE (ENGLISH OR BILINGUAL)
ADLS_ACUITY_SCORE: 17
FALL_HISTORY_WITHIN_LAST_SIX_MONTHS: NO
DIFFICULTY_COMMUNICATING: NO
ADLS_ACUITY_SCORE: 15
ADLS_ACUITY_SCORE: 17
VISION_MANAGEMENT: GLASSES
ADLS_ACUITY_SCORE: 16
WEAR_GLASSES_OR_BLIND: YES
ADLS_ACUITY_SCORE: 17
TOILETING_ISSUES: NO
HEARING_DIFFICULTY_OR_DEAF: NO

## 2021-07-20 NOTE — PLAN OF CARE
End of Shift Summary  For vital signs and complete assessments, please see documentation flowsheets.     Pertinent assessments: VSS. C/o pain in midline abdomen, given oxycodone. [place] in place, pink tinged urine. Up ind. BG low, given juice with improvement.  Major Shift Events: admitted floor  Treatment Plan: Urology consult, [place]

## 2021-07-20 NOTE — ED NOTES
[facility]  ED Nurse Handoff Report    Gil Chase is a 65 year old male   ED Chief complaint: No chief complaint on file.  . ED Diagnosis:   Final diagnoses:   Acute urinary retention   Acute renal failure, unspecified acute renal failure type (H)   Bilateral hydronephrosis     Allergies: No Known Allergies    Code Status: Full Code  Activity level - Baseline/Home:  Independent. Activity Level - Current:   Independent. Lift room needed: No. Bariatric: No   Needed: No   Isolation: No. Infection: Not Applicable.     Vital Signs:   Vitals:    07/19/21 1812   BP: (!) 168/87   Pulse: 73   Resp: 16   Temp: 98.6  F (37  C)   TempSrc: Oral   SpO2: 98%       Cardiac Rhythm:  ,      Pain level:    Patient confused: No. Patient Falls Risk: No.   Elimination Status: Has voided but urinary retention present. Ram catheter in place for retention  Patient Report - Initial Complaint: Abnormal labs, Abdomen pain. Focused Assessment: See Physician NOte   Tests Performed: Labs, Urine, CT. Abnormal Results: See Results.   Treatments provided: Ram, fluids, Obs admit  Family Comments: Wife present agreeable to admit  OBS brochure/video discussed/provided to patient:  N/A  ED Medications:   Medications   0.9% sodium chloride BOLUS (1,000 mLs Intravenous New Bag 7/19/21 2101)     Drips infusing:  No  For the majority of the shift, the patient's behavior Green. Interventions performed were None.    Sepsis treatment initiated: No     Patient tested for COVID 19 prior to admission: YES    ED Nurse Name/Phone Number: Princess Guerra RN,   9:36 PM    RECEIVING UNIT ED HANDOFF REVIEW    Above ED Nurse Handoff Report was reviewed: Yes  Reviewed by: Minerva Vizcarra RN on July 20, 2021 at 2:17 AM

## 2021-07-20 NOTE — H&P
Hospitalist Admission Note    Name: Gil Chase    MRN: [id number]          YOB: 1955    Age: 65 year old  Date of admission: 7/19/2021  Primary care provider: No Ref-Primary, Physician              Assessment:       Brief summary of admission assessment:    Gil Chase is a 65 year old  male with a significant past medical history of type 2 diabetes, chronic kidney disease, hypertension, ADHD, ED who presents with abnormal blood test result.  Patient was seen by his primary care provider last Friday for complaints of urinary symptoms when he had blood test and was sent home.  Patient was told to come to emergency department due to serum creatinine of 4.15.    Patient was accompanied by his wife and had no significant symptoms except for mild abdominal pain.  On arrival to emergency department his blood pressure was 168/87, respiratory rate of 16, pulse of 73, temp of 98.6 and oxygen saturation of 98%.    Complete blood count showed hemoglobin 12.0 otherwise normal WBC and platelets.  Basic metabolic panel showed BUN of 55 and creatinine of 4.63.  Bicarb and potassium within normal limits.  His Magalie was 2.1.    CT scan of the abdomen pelvis without contrast showed severe bilateral hydronephrosis and hydroureter as well as marked urinary bladder distention suggestive of bladder outlet obstruction likely secondary to parotid gland enlargement.    Patient had Ram catheter placed and hospital service was consulted for admission and inpatient management.        Admission diagnoses:       #1.KAMALA on CKD: Patient has baseline creatinine of around 1.4.  His serum creatinine increased to 4.63 today likely from obstructive uropathy from bladder outlet obstruction from prostate enlargement.  I suspect there is underlying chronic kidney disease on the basis of his hypertension and diabetes    Patient has no evidence of metabolic acidosis or hyperkalemia    #2.  Urinary retention,  hydroureter and severe bilateral hydronephrosis likely from bladder outlet obstruction secondary to large prostate.  Patient denies any history of BPH and urinary symptoms has been recent.  His PSA was around six recently    #3.  Type 2 diabetes on weekly semaglutide.  Previously on Metformin    #4.  Hypertension on hydrochlorothiazide and lisinopril      Active comorbid medical conditions:    ADHD    Hyperlipidemia    ED        COVID-19 Testing : Negative       Plan/MDM:       > Admission Status: Will admit patient to hospitalist service as inpatient as patient likely need over two mid night stays in the hospital.     >Care plan:      Bladder decompression with urinary Ram catheter, patient is not in severe pain and I suspect this is subacute urinary obstruction.  Consult urology team in the morning  for further evaluation recommendation.    Will hold with hydrochlorothiazide lisinopril    Control his blood pressure with oral clonidine 0.1 mg p.o. twice a day with as needed hydralazine to  hypertensive urgency    May need to see nephrology if his kidney function does not get better with bladder decompression    Sliding-scale insulin    Pain medications    Avoid nephrotoxic medications    >Supportive care:Pain management: acetominophen, anxiolytics, oral narcotics and IV narcotics  Nausea and vomiting control measures    >Diet:Diet advanced    >Activity:Advance activity as tolerated    >Education/Counseling :Anticipatory guidance offered  Discussed treatment plan with the patient    >Consults:Inpatient consult with urology    >VTE prophylactic measures:prophylaxis against venous thromboembolism with PCD     >Therapies: None for now      >Additional orders:    --Care plan discussed with the patient/family and agreed to care plan   --Patient will be transferred to care of hospitalist attending for further evaluation and management as appropriate   --Old medical orders reviewed   --imaging result independently  reviewed by me     (See orders placed for this visit by me )     - Home medication reviewed and will be continued as appropriate once pharmacy reconciliation is completed         Code Status/Disposition:     >Code Status:Full Code      >Disposition:anticipate discharge to home and Anticipate discharge in 3 days        Disclaimer: This note consists of symbols derived from keyboarding, dictation and/or voice recognition software. As a result, there may be errors in the script that have gone undetected. Please consider this when interpreting information found in this chart.             Chief Complaint:       Abnormal test result     History is obtained from the patient          History of Present Illness:      This patient is a 65 year old  male with a significant past medical history of hypertension, diabetes who presents with the following condition requiring a hospital admission:      Acute kidney injury on chronic kidney disease secondary to obstructive uropathy from bladder outlet obstruction.    Patient is 65-year-old male with history of diabetes on Ozempic, hypertension on hydrochlorothiazide and lisinopril who was referred by his primary care provider due to his elevated serum creatinine which was obtained last Friday.  Patient was seen by his primary care provider at [facility] for symptoms of urinary urgency dysuria and difficulty urinating.  Patient had urine test and serum creatinine was obtained and he was recommended to follow-up with his primary care provider.  He has no abdominal pain which is mild.  He has no associated nausea or vomiting.  Patient was told to come to the emergency department due to significantly elevated serum creatinine at 4.15.  Further evaluation in the emergency department here in the hospital showed evidence of urinary obstruction likely from prostatic enlargement.         Past Medical History:   Hypertension  Type 2 diabetes  ADHD  ED  Hyperlipidemia         Past Surgical  History:     Past Surgical History:   Procedure Laterality Date     AR REV VASO-VASECTOMY ADJ       VASECTOMY      pt has vas reversal             Social History:     Social History     Tobacco Use     Smoking status: Never Smoker     Smokeless tobacco: Never Used   Substance Use Topics     Alcohol use: Yes     Comment: 2oz wk             Family History:   Reviewed and non contributory        Allergies:   No Known Allergies          Medications:        Prior to Admission medications    Medication Sig Last Dose Taking? Auth Provider   amphetamine-dextroamphetamine (ADDERALL XR) 30 MG 24 hr capsule Take 30 mg by mouth daily   Reported, Patient   amphetamine-dextroamphetamine (ADDERALL) 20 MG tablet Take 20 mg by mouth daily   Unknown, Entered By History   hydrochlorothiazide (HYDRODIURIL) 25 MG tablet Take 25 mg by mouth daily    Reported, Patient   lisinopril (PRINIVIL/ZESTRIL) 10 MG tablet Take 10 mg by mouth daily   Reported, Patient   SEMAGLUTIDE,0.25 OR 0.5MG/DOS, SC Inject 0.5 mg Subcutaneous once a week On Tuesdays   Unknown, Entered By History          Review of Systems:     A Comprehensive greater than 10 system review of systems was carried out.  Pertinent positives and negatives are noted above in HPI.  Otherwise negative for contributory information.           Physical Exam:     Vital signs were reviewed    Temp:  [98.6  F (37  C)] 98.6  F (37  C)  Pulse:  [73] 73  Resp:  [16] 16  BP: (168)/(87) 168/87  SpO2:  [98 %] 98 %        GEN: awake, alert, cooperative, no apparent distress, oriented x 3    NECK:Supple ,no mass or thyromegaly     HEENT:  Normocephalic/atraumatic, no scleral icterus, no nasal discharge, mouth moist.    CV:  Regular rate and rhythm, no murmur or JVD.  S1 + S2 noted, no S3 or S4.    LUNGS:  Clear to auscultation bilaterally without rales/rhonchi/wheezing/retractions.  Symmetric chest rise on inhalation noted.    ABD: Abdomen distended and apparently obese with significant lower  abdominal swelling distention.  Mildly tender lower abdomen but no rebound tenderness.  No guarding or rigidity.    EXT:  No edema.  No cyanosis.  No joint synovitis noted.Lower extremity pulses are normal bilaterally and     LGS: No cervical or axillary lymphadenopathy     SKIN:  Dry to touch, warm ,no exanthems noted in the visualized areas.    Neurologic:Grossly intact,non focal . No acute focal neurologic deficit     Psychaitric exam: Mood and affect normal                  Data:       All laboratory and imaging data in the past 24 hours reviewed     Results for orders placed or performed during the hospital encounter of 07/19/21   Abd/pelvis CT no contrast - Stone Protocol     Status: None    Narrative    EXAM: CT ABDOMEN PELVIS W/O CONTRAST  LOCATION: [facility]  DATE/TIME: 7/19/2021 9:05 PM    INDICATION: Flank pain, kidney stone suspected Abdominal pain, hernia suspected  COMPARISON: None.  TECHNIQUE: CT scan of the abdomen and pelvis was performed without IV contrast. Multiplanar reformats were obtained. Dose reduction techniques were used.  CONTRAST: None.    FINDINGS:   LOWER CHEST: 1 small hiatal hernia suggested. Incidental calcified granuloma in the inferior lingula.    HEPATOBILIARY: Normal.    PANCREAS: Normal.    SPLEEN: Normal.    ADRENAL GLANDS: Normal.    KIDNEYS/BLADDER: Marked bilateral hydronephrosis and hydroureter. Marked urinary bladder distention, urinary bladder 25 cm long by 11 cm diameter. There is urinary bladder wall thickening without calcification. No renal or ureteral calcification.    BOWEL: Normal.    LYMPH NODES: Normal.    VASCULATURE: Unremarkable.    PELVIC ORGANS: Moderate prostate gland enlargement.    MUSCULOSKELETAL: Prior left femoral neck fracture repair suggested.      Impression    IMPRESSION:   1.  Severe bilateral hydronephrosis and hydroureter as well as marked urinary bladder distention. Findings suggest bladder outlet obstruction which may be  secondary to prostate gland enlargement. No urinary tract calcification.     Basic metabolic panel (BMP)     Status: Abnormal   Result Value Ref Range    Sodium 142 133 - 144 mmol/L    Potassium 4.0 3.4 - 5.3 mmol/L    Chloride 106 94 - 109 mmol/L    Carbon Dioxide (CO2) 29 20 - 32 mmol/L    Anion Gap 7 3 - 14 mmol/L    Urea Nitrogen 55 (H) 7 - 30 mg/dL    Creatinine 4.63 (H) 0.66 - 1.25 mg/dL    Calcium 9.3 8.5 - 10.1 mg/dL    Glucose 85 70 - 99 mg/dL    GFR Estimate 12 (L) >60 mL/min/1.73m2   UA reflex to Microscopic     Status: Abnormal   Result Value Ref Range    Color Urine Light Yellow Colorless, Straw, Light Yellow, Yellow    Appearance Urine Clear Clear    Glucose Urine Negative Negative mg/dL    Bilirubin Urine Negative Negative    Ketones Urine Negative Negative mg/dL    Specific Gravity Urine 1.009 1.003 - 1.035    Blood Urine Negative Negative    pH Urine 5.5 5.0 - 7.0    Protein Albumin Urine Negative Negative mg/dL    Urobilinogen Urine Normal Normal, 2.0 mg/dL    Nitrite Urine Negative Negative    Leukocyte Esterase Urine Trace (A) Negative    Bacteria Urine Few (A) None Seen /HPF    RBC Urine <1 <=2 /HPF    WBC Urine 2 <=5 /HPF   Extra Red Top Tube     Status: None   Result Value Ref Range    Hold Specimen JIC    Extra Green Top (Lithium Heparin) Tube     Status: None   Result Value Ref Range    Hold Specimen JIC    Extra Purple Top Tube     Status: None   Result Value Ref Range    Hold Specimen JIC    CBC with platelets and differential     Status: Abnormal   Result Value Ref Range    WBC Count 6.8 4.0 - 11.0 10e3/uL    RBC Count 4.02 (L) 4.40 - 5.90 10e6/uL    Hemoglobin 12.0 (L) 13.3 - 17.7 g/dL    Hematocrit 35.8 (L) 40.0 - 53.0 %    MCV 89 78 - 100 fL    MCH 29.9 26.5 - 33.0 pg    MCHC 33.5 31.5 - 36.5 g/dL    RDW 12.5 10.0 - 15.0 %    Platelet Count 212 150 - 450 10e3/uL    % Neutrophils 69 %    % Lymphocytes 19 %    % Monocytes 7 %    % Eosinophils 4 %    % Basophils 1 %    % Immature  Granulocytes 0 %    NRBCs per 100 WBC 0 <1 /100    Absolute Neutrophils 4.7 1.6 - 8.3 10e3/uL    Absolute Lymphocytes 1.3 0.8 - 5.3 10e3/uL    Absolute Monocytes 0.4 0.0 - 1.3 10e3/uL    Absolute Eosinophils 0.3 0.0 - 0.7 10e3/uL    Absolute Basophils 0.1 0.0 - 0.2 10e3/uL    Absolute Immature Granulocytes 0.0 <=0.0 10e3/uL    Absolute NRBCs 0.0 10e3/uL   Fractional Excretion of Sodium     Status: None   Result Value Ref Range    %FENA 2.1 %    Sodium Urine mmol/L 44 mmol/L    Creatinine Urine mg/dL 67 mg/dL   SARS-COV2 (COVID-19) Virus RT-PCR     Status: Normal    Specimen: Nasopharyngeal; Swab   Result Value Ref Range    SARS CoV2 PCR Negative Negative    Narrative    Testing was performed using the isis  SARS-CoV-2 & Influenza A/B Assay on the isis  [employer]  System.  This test should be ordered for the detection of SARS-COV-2 in individuals who meet SARS-CoV-2 clinical and/or epidemiological criteria. Test performance is unknown in asymptomatic patients.  This test is for in vitro diagnostic use under the FDA EUA for laboratories certified under CLIA to perform moderate and/or high complexity testing. This test has not been FDA cleared or approved.  A negative test does not rule out the presence of PCR inhibitors in the specimen or target RNA in concentration below the limit of detection for the assay. The possibility of a false negative should be considered if the patient's recent exposure or clinical presentation suggests COVID-19.  [facility] are certified under the Clinical Laboratory Improvement Amendments of 1988 (CLIA-88) as qualified to perform moderate and/or high complexity laboratory testing.   Asymptomatic COVID-19 Virus (Coronavirus) by PCR Nasopharyngeal     Status: Normal    Specimen: Nasopharyngeal; Swab    Narrative    The following orders were created for panel order Asymptomatic COVID-19 Virus (Coronavirus) by PCR Nasopharyngeal.  Procedure                                Abnormality         Status                     ---------                               -----------         ------                     SARS-COV2 (COVID-19) Vir...[[id number]]  Normal              Final result                 Please view results for these tests on the individual orders.   [place] Draw     Status: None    Narrative    The following orders were created for panel order [place] Draw.  Procedure                               Abnormality         Status                     ---------                               -----------         ------                     Extra Red Top Tube[[id number]]                               Final result               Extra Green Top (Lithium...[[id number]]                      Final result               Extra Purple Top Tube[[id number]]                            Final result                 Please view results for these tests on the individual orders.   CBC + differential     Status: Abnormal    Narrative    The following orders were created for panel order CBC + differential.  Procedure                               Abnormality         Status                     ---------                               -----------         ------                     CBC with platelets and d...[[id number]]  Abnormal            Final result                 Please view results for these tests on the individual orders.   Fractional excretion of sodium     Status: None    Narrative    The following orders were created for panel order Fractional excretion of sodium.  Procedure                               Abnormality         Status                     ---------                               -----------         ------                     Fractional Excretion of ...[[id number]]                      Final result                 Please view results for these tests on the individual orders.           All imaging studies reviewed by me.         Patient`s old medical records reviewed and case discussed with the ED  physician.    ED course-Reviewed  and care plan discussed with Dr. Pino

## 2021-07-20 NOTE — PLAN OF CARE
A&Ox4. Up independent in room. C/o of 8/10 pain, IV Dilaudid given x3 and using ice, decrease in pain reported. BG 86 and 103, on Renal diet, no appetite, only taking in clear liquids. Ram in place watermelon colored urine, hgb stable at 12.3. Creat. 3.89 today. Urology following, will leave Ram in place at discharge and he will follow up outpt with urology as well for prostate MRI. Discharge pending.

## 2021-07-20 NOTE — ED PROVIDER NOTES
History   Chief Complaint:  Abnormal Labs      HPI   Gil Chase is a 65 year old male with history of hypertension and type II diabetes who presents accompanied by his wife for evaluation of abnormal labs. On 7/16/2021 the patient was seen in his family medicine clinic for two weeks of abdominal discomfort with some dysuria, urgency, and difficulty urinating. At that time his pain was thought to be likely due to a hernia and he had labs drawn as below which returned notable for a newly elevated creatinine to 4.15. Due to these results the patient was called today and advised to come into the ED for evaluation. He denies any fever, shortness of breath, chest pain, leg swelling, nausea, vomiting, or diarrhea. He denies any family history of kidney disease. He is vaccinated for COVID-19.     Labs 7/16/2021:  CBC: WBC 8.9, HGB 13.0,    BMP: BUN 49 high, Creatinine 4.15 high, GFR 18 low, o/w WNL   TSH: 2.33   C-reactive protein: 1.33 high   UA: Protein total, o/w WNL     Review of Systems   Constitutional: Negative for fever.   Respiratory: Negative for shortness of breath.    Cardiovascular: Negative for chest pain and leg swelling.   Gastrointestinal: Positive for abdominal pain. Negative for diarrhea, nausea and vomiting.   Genitourinary: Positive for difficulty urinating, dysuria and urgency.   All other systems reviewed and are negative.    Allergies:  No known drug allergies      Medications:  Lipitor   Ozempic   Hydrochlorothiazide   Lisinopril   Adderall XR   Cialis     Past Medical History:    Diabetes mellitus, type II   ADHD   ED   Hypertension      Past Surgical History:    Vasectomy      Family History:    The patient denies any family history of kidney disease.      Social History:  Marital status:   The patient presents to the ED accompanied by his wife.   Alcohol use: 4-5 oz / week   The patient is vaccinated for COVID-19.     Physical Exam     Patient Vitals for the past 24 hrs:   BP  Temp Temp src Pulse Resp SpO2   07/19/21 2130 (!) 158/86 -- -- -- -- 99 %   07/19/21 2100 (!) 160/93 -- -- 66 -- 99 %   07/19/21 2045 (!) 155/98 -- -- 63 -- 100 %   07/19/21 1812 (!) 168/87 98.6  F (37  C) Oral 73 16 98 %     Physical Exam  General: Alert, appears well-developed and well-nourished. Cooperative.     In mild distress  HEENT:  Head:  Atraumatic  Ears:  External ears are normal  Mouth/Throat:  Oropharynx is without erythema or exudate and mucous membranes are moist.   Eyes:   Conjunctivae normal and EOM are normal. No scleral icterus.  CV:  Normal rate, regular rhythm, normal heart sounds and radial pulses are 2+ and symmetric.  No murmur.  Resp:  Breath sounds are clear bilaterally    Non-labored, no retractions or accessory muscle use  GI:  Abdomen is soft, distension, mild periumbilical tenderness. No rebound or guarding.  No CVA tenderness bilaterally  MS:  Normal range of motion. No edema.    Normal strength in all 4 extremities.     Back atraumatic.    No midline cervical, thoracic, or lumbar tenderness  Skin:  Warm and dry.  No rash or lesions noted.  Neuro: Alert. Normal strength.  GCS: 15  Psych:  Normal mood and affect.    Emergency Department Course     Imaging:  Abd/Pelvis CT No Contrast - Stone Protocol:  IMPRESSION:   1.  Severe bilateral hydronephrosis and hydroureter as well as marked urinary bladder distention. Findings suggest bladder outlet obstruction which may be secondary to prostate gland enlargement. No urinary tract calcification.   Per radiology.      Laboratory:  CBC: WBC 6.8, HGB 12.0 low,    BMP: Urea nitrogen 55 high, Creatinine 4.63 high, GFR estimate 12 low, o/w WNL   Fractional excretion of sodium: 2.1% FENA, Sodium urine 44, Creatinine urine 67   UA reflex to Microscopic: Trace leukocyte esterase, Few bacteria, o/w WNL   Urine Culture: Pending   Asymptomatic COVID19 Virus PCR by nasopharyngeal swab: Negative       Emergency Department Course:  Reviewed:  I  reviewed nursing notes, vitals and past medical history    Assessments:  2033:  I obtained history and examined the patient as noted above.     Consults:   2121: I spoke with Dr. Payne of the hospitalist service regarding patient's presentation, findings, and plan of care.     Interventions:  2101 NS 1,000 mL IV     Disposition:  The patient was admitted to the hospital under the care of Dr. Payne.       Impression & Plan   Medical Decision Making:  Gil Chase is a 65 year old male who presents with an abnormal creatinine.  Creatinine concerning for acute renal failure. Work-up done in the emergency department concerning for severe bilateral hydronephrosis and hydroureter secondary to marked urinary bladder distension. I do suspect bladder outlet obstruction likely secondary to prostate gland enlargement. The patient has been having dysuria for the last two weeks but urinalysis is relatively unremarkable. I did add a urine culture to this urinalysis tonight.  No antibiotics indicated from the ED at this time.  The patient was given IV fluids here and a Ram catheter was placed due to urinary retention. He will be admitted to the hospital for further urologic consultation and evaluation. I spoke with Dr. Payne of the hospitalist service who agreed to inpatient admission.     Covid-19  Gil Chase was evaluated during a global COVID-19 pandemic, which necessitated consideration that the patient might be at risk for infection with the SARS-CoV-2 virus that causes COVID-19.   Applicable protocols for evaluation were followed during the patient's care.   COVID-19 was considered as part of the patient's evaluation. The plan for testing is:  a test was obtained during this visit.     Diagnosis:    ICD-10-CM    1. Acute urinary retention  R33.8    2. Acute renal failure, unspecified acute renal failure type (H)  N17.9    3. Bilateral hydronephrosis  N13.30          Scribe Disclosure:  Ranjit DELACRUZ am  serving as a scribe at 8:33 PM on 7/19/2021 to document services personally performed by Andrzej Pino MD based on my observations and the provider's statements to me.         Andrzej Pino MD  07/19/21 1973

## 2021-07-20 NOTE — CONSULTS
[facility] Urology Consult     Name: Gil Chase    MRN: [id number]   YOB: 1955         We were asked to see Gil Chase in consultation from Dr. Payne for evaluation and treatment of urinary retention.          Chief Complaint:   Urinary retention  History is obtained from the patient and EMR.          History of Present Illness:   Gil Chase is a 65 year old male with urinary retention, admitted for abnormal labs and abdominal pain. He was seen in family medicine on 07/16/2021 for 2 weeks of abdominal pain, dysuria, urgency, difficulty with urination. He had a creatinine noted to be newly elevated at 4.15. Patient underwent CT imaging yesterday which showed severe bilateral hydronephrosis and an extremely distended bladder measuring 25 cm x 11 cm. There was bladder wall thickening.    Ram catheter was placed. Patient notes that they removed approximately 2.4 L of urine. He notes  that prior to Ram catheter placement, he has never had hematuria. Currently has gross hematuria. Prior to hospitalization, he had noted abdominal pain for some time. He does note that his urination has changed. He has noted more intermittency and hesitancy. He also has started to notice some leakage. He denies a sense of urgency with this. Patient denies any dysuria or history of urinary tract infections.He has also been noting suprapubic discomfort. He was uncertain if he was emptying his bladder completely. He does note that his brother had his prostate out, but he is uncertain why.  Endorses urgency and frequency.    Patient has diabetes, but it has been relatively well controlled. He was seen by Dr. Warren in 2019 for PSA of 3.1. Digital rectal examination had no concerning findings at that time. He was recommended to continue with digital rectal examination and PSA annually. Recently, patient's PSA has begun to elevate. In December 2020 it was 5.54. In April 2021, it was 6.24. He was recommended to see a  urologist regarding his elevated PSA.    Patient's baseline creatinine is approximately 1.4. Creatinine was not drawn at the time of our visit, but is available now and is noted to be 3.89 with EGFR 15. UA not convincing for infection.    Patient denies any recent trauma or neurological conditions. Afebrile. No tachycardia.    Patient denies any nausea, vomiting, fevers, chills, shortness of breath, or chest pain. He does endorse a headache. He does note that he lost a significant amount of weight on Ozempic. He then began to gain back weight over the last several months.     Has previously been on testosterone supplementation. He discussed his urinary issues with his endocrinologist.    Allergies:  No known drug allergies      Medications:  Lipitor   Ozempic   Hydrochlorothiazide   Lisinopril   Adderall XR   Cialis      Past Medical History:    Diabetes mellitus, type II   ADHD   ED   Hypertension    BPH  Hypogonadism     Past Surgical History:    Vasectomy    Vasectomy reversal.    Family History:    Brother had prostate issues.      Social History:  Marital status:   Alcohol use: 4-5 oz / week   The patient is vaccinated for COVID-19.           ROS: 10 point ROS neg other than the symptoms noted above in the HPI.          Physical Exam:     VS:  T: 97.6    HR: 66    BP: 142/77    RR: 16   GEN:  AOx3.  NAD.  Pleasant.  GEN: NAD, lying in bed  EYES: EOMI  MOUTH: MMM  NECK: Supple  RESP: Unlabored breathing, RA  CARDIAC: No LE edema  SKIN: Warm  ABD: soft, tender in the suprapubic area.  NEURO: AAO  URO: Ram draining red urine.  Prostate: approximately 65-70 g.  Right side slightly firmer than left, broad.  Non-tender.  Normal rectal tone.          Data:   All laboratory data reviewed:    Recent Labs   Lab 07/20/21  0756 07/19/21  1814   WBC 7.6 6.8   HGB 12.3* 12.0*    212     Recent Labs   Lab 07/20/21  1212 07/20/21  1202 07/20/21  0816 07/20/21  0449 07/19/21  1814     --   --   --  142    POTASSIUM 3.8  --   --   --  4.0   CHLORIDE 107  --   --   --  106   CO2 26  --   --   --  29   BUN 51*  --   --   --  55*   CR 3.89*  --   --   --  4.63*   GLC 98 103* 86 80 85   RACHEL 9.1  --   --   --  9.3     Recent Labs   Lab 07/19/21  1815   COLOR Light Yellow   APPEARANCE Clear   URINEGLC Negative   URINEBILI Negative   URINEKETONE Negative   SG 1.009   URINEPH 5.5   PROTEIN Negative   NITRITE Negative   LEUKEST Trace*   RBCU <1   WBCU 2     Abd/pelvis CT no contrast - Stone Protocol    Result Date: 7/19/2021  EXAM: CT ABDOMEN PELVIS W/O CONTRAST LOCATION: [facility] DATE/TIME: 7/19/2021 9:05 PM INDICATION: Flank pain, kidney stone suspected Abdominal pain, hernia suspected COMPARISON: None. TECHNIQUE: CT scan of the abdomen and pelvis was performed without IV contrast. Multiplanar reformats were obtained. Dose reduction techniques were used. CONTRAST: None. FINDINGS: LOWER CHEST: 1 small hiatal hernia suggested. Incidental calcified granuloma in the inferior lingula. HEPATOBILIARY: Normal. PANCREAS: Normal. SPLEEN: Normal. ADRENAL GLANDS: Normal. KIDNEYS/BLADDER: Marked bilateral hydronephrosis and hydroureter. Marked urinary bladder distention, urinary bladder 25 cm long by 11 cm diameter. There is urinary bladder wall thickening without calcification. No renal or ureteral calcification. BOWEL: Normal. LYMPH NODES: Normal. VASCULATURE: Unremarkable. PELVIC ORGANS: Moderate prostate gland enlargement. MUSCULOSKELETAL: Prior left femoral neck fracture repair suggested.     IMPRESSION: 1.  Severe bilateral hydronephrosis and hydroureter as well as marked urinary bladder distention. Findings suggest bladder outlet obstruction which may be secondary to prostate gland enlargement. No urinary tract calcification.          Impression and Plan:   Impression:   Gil Chase is a 65 year old male with acute on possible chronic urinary retention   Abdominal pain  BPH  Severe bilateral hydronephrosis  likely secondary to retention  KAMALA from above  Elevated PSA-persistent      Plan:   -manage urinary retention with indwelling catheter  -start Flomax 0.4mg QD - monitor for orthostatic hypotension as this is the most common side effect. (Male patients can experience retrograde ejaculation).   -f/u with urology for trial of void after at least 2 weeks. Our office will call to coordinate. Discussed possibility of CIC.  -Would recommend further evaluation to include cystocopy, MRI of the prostate (for elevated PSA), and likely urodynamics study to determine how obstructive his prostate is and how function his bladder is.      Discussed with Dr. Chavez and RN    Valerie Martinez PA-C    [facility] Urology  [phone]

## 2021-07-20 NOTE — PROGRESS NOTES
[facility]  Hospitalist Progress Note  Glenn Madison MD, MD 07/20/2021  (Text Page)  Reason for Stay (Diagnosis): KAMALA on top of CKD  Bilateral hydronephrosis and hydroureters         Assessment and Plan:      Summary of Stay: Gil Chase is a 65 year old male with known history of ADD, CKD, diabetes mellitus, hypertension admitted on 7/19/2021 with abnormal lab testing and received a call back due to increased creatinine with complaints of abdominal tenderness for at least more than a week duration    Problem List:   1. KAMALA on top of CKD secondary to suspected bladder outlet obstruction likely from large prostate  2. Urinary retention with hydroureter and bilateral hydronephrosis  3. Non-insulin-requiring diabetes mellitus  4. Hypertension  5. Dyslipidemia  6. History of ADD  7. History of erectile dysfunction    Continue current care.  Remain at risk for clinical deterioration.  Optimize pain control.  Urology evaluation pending  As an indwelling Ram catheter  -Has a pending basic metabolic panel today  Avoid further nephrotoxic agents  -Anticipating improvement with kidney function once obstruction improves and resolves  -Currently on Flomax  -Hold his HCTZ and ACE inhibitor's given concurrent KAMALA  -We will resume other home regimen for other medical conditions  -On insulin sliding scale    DVT Prophylaxis: Pneumatic Compression Devices  Code Status: Full Code  Discharge Dispo: home  Estimated Disch Date / # of Days until Disch: 1-2 days once kidney function improves, improving bladder outlet obstruction        Interval History (Subjective):      I assume service care today.  Seen and examined.  Chart reviewed.  Gil is still having intermittent abdominal cramps mostly at the hypogastric area  -No reported nausea or vomiting.  No diarrhea.  -Also having some spasms at the bladder area  -No reported mental status changes.  Afebrile.  Not requiring any oxygen support.  Stable  hemodynamics.              Physical Exam:      Last Vital Signs:  BP (!) 142/77 (BP Location: Left arm)   Pulse 66   Temp 97.6  F (36.4  C) (Oral)   Resp 16   SpO2 99%     I/O last 3 completed shifts:  In: -   Out: 1900 [Urine:1900]  Wt Readings from Last 1 Encounters:   05/20/21 97.1 kg (214 lb)     There were no vitals filed for this visit.    Constitutional: Awake, alert, cooperative, no apparent distress   Respiratory: Clear to auscultation bilaterally, no crackles or wheezing   Cardiovascular: Regular rate and rhythm, normal S1 and S2, and no murmur noted   Abdomen: Normal bowel sounds, soft, minimal distended, some tenderness at the hypogastric area   Skin: No rashes, no cyanosis, dry to touch   Neuro: Alert and oriented x3, no weakness, spontaneous and coherent speech   Extremities: No edema, normal range of motion   Other(s): Euthymic mood, not agitated    Indwelling Ram catheter in place   All other systems: Negative          Medications:      All current medications were reviewed with changes reflected in problem list.         Data:      All new lab and imaging data was reviewed.   Labs:  No results for input(s): CULT in the last 168 hours.  Recent Labs   Lab 07/20/21 0756 07/19/21  1814   WBC 7.6 6.8   HGB 12.3* 12.0*   HCT 37.0* 35.8*   MCV 90 89    212     Recent Labs   Lab 07/20/21  0816 07/20/21 0449 07/20/21 0432 07/19/21  1814   NA  --   --   --  142   POTASSIUM  --   --   --  4.0   CHLORIDE  --   --   --  106   CO2  --   --   --  29   ANIONGAP  --   --   --  7   GLC 86 80 69* 85   BUN  --   --   --  55*   CR  --   --   --  4.63*   GFRESTIMATED  --   --   --  12*   RACHEL  --   --   --  9.3     No results for input(s): SED, CRP in the last 168 hours.  Recent Labs   Lab 07/20/21  0816 07/20/21 0449 07/20/21 0432 07/19/21  1814   GLC 86 80 69* 85     No results for input(s): INR in the last 168 hours.  No results for input(s): LIPASE in the last 168 hours.  No results for input(s):  TROPONIN, TROPI, TROPR in the last 168 hours.    Invalid input(s): TROP, TROPONINIES   Imaging:   Results for orders placed or performed during the hospital encounter of 07/19/21   Abd/pelvis CT no contrast - Stone Protocol    Narrative    EXAM: CT ABDOMEN PELVIS W/O CONTRAST  LOCATION: [facility]  DATE/TIME: 7/19/2021 9:05 PM    INDICATION: Flank pain, kidney stone suspected Abdominal pain, hernia suspected  COMPARISON: None.  TECHNIQUE: CT scan of the abdomen and pelvis was performed without IV contrast. Multiplanar reformats were obtained. Dose reduction techniques were used.  CONTRAST: None.    FINDINGS:   LOWER CHEST: 1 small hiatal hernia suggested. Incidental calcified granuloma in the inferior lingula.    HEPATOBILIARY: Normal.    PANCREAS: Normal.    SPLEEN: Normal.    ADRENAL GLANDS: Normal.    KIDNEYS/BLADDER: Marked bilateral hydronephrosis and hydroureter. Marked urinary bladder distention, urinary bladder 25 cm long by 11 cm diameter. There is urinary bladder wall thickening without calcification. No renal or ureteral calcification.    BOWEL: Normal.    LYMPH NODES: Normal.    VASCULATURE: Unremarkable.    PELVIC ORGANS: Moderate prostate gland enlargement.    MUSCULOSKELETAL: Prior left femoral neck fracture repair suggested.      Impression    IMPRESSION:   1.  Severe bilateral hydronephrosis and hydroureter as well as marked urinary bladder distention. Findings suggest bladder outlet obstruction which may be secondary to prostate gland enlargement. No urinary tract calcification.

## 2021-07-21 ENCOUNTER — TELEPHONE (OUTPATIENT)
Dept: UROLOGY | Facility: CLINIC | Age: 66
End: 2021-07-21

## 2021-07-21 LAB
ANION GAP SERPL CALCULATED.3IONS-SCNC: 6 MMOL/L (ref 3–14)
BACTERIA UR CULT: NO GROWTH
BUN SERPL-MCNC: 43 MG/DL (ref 7–30)
CALCIUM SERPL-MCNC: 9.4 MG/DL (ref 8.5–10.1)
CHLORIDE BLD-SCNC: 105 MMOL/L (ref 94–109)
CO2 SERPL-SCNC: 27 MMOL/L (ref 20–32)
CREAT SERPL-MCNC: 3.27 MG/DL (ref 0.66–1.25)
GFR SERPL CREATININE-BSD FRML MDRD: 19 ML/MIN/1.73M2
GLUCOSE BLD-MCNC: 101 MG/DL (ref 70–99)
GLUCOSE BLDC GLUCOMTR-MCNC: 82 MG/DL (ref 70–99)
GLUCOSE BLDC GLUCOMTR-MCNC: 91 MG/DL (ref 70–99)
GLUCOSE BLDC GLUCOMTR-MCNC: 94 MG/DL (ref 70–99)
GLUCOSE BLDC GLUCOMTR-MCNC: 96 MG/DL (ref 70–99)
GLUCOSE BLDC GLUCOMTR-MCNC: 98 MG/DL (ref 70–99)
POTASSIUM BLD-SCNC: 3.7 MMOL/L (ref 3.4–5.3)
SODIUM SERPL-SCNC: 138 MMOL/L (ref 133–144)

## 2021-07-21 PROCEDURE — 120N000001 HC R&B MED SURG/OB

## 2021-07-21 PROCEDURE — 36415 COLL VENOUS BLD VENIPUNCTURE: CPT | Performed by: INTERNAL MEDICINE

## 2021-07-21 PROCEDURE — 250N000013 HC RX MED GY IP 250 OP 250 PS 637: Performed by: INTERNAL MEDICINE

## 2021-07-21 PROCEDURE — 80048 BASIC METABOLIC PNL TOTAL CA: CPT | Performed by: INTERNAL MEDICINE

## 2021-07-21 PROCEDURE — 99207 PR CDG-CUT & PASTE-POTENTIAL IMPACT ON LEVEL: CPT | Performed by: INTERNAL MEDICINE

## 2021-07-21 PROCEDURE — 250N000009 HC RX 250: Performed by: INTERNAL MEDICINE

## 2021-07-21 PROCEDURE — 250N000011 HC RX IP 250 OP 636: Performed by: INTERNAL MEDICINE

## 2021-07-21 PROCEDURE — 250N000013 HC RX MED GY IP 250 OP 250 PS 637: Performed by: PHYSICIAN ASSISTANT

## 2021-07-21 PROCEDURE — 99233 SBSQ HOSP IP/OBS HIGH 50: CPT | Performed by: INTERNAL MEDICINE

## 2021-07-21 PROCEDURE — 99232 SBSQ HOSP IP/OBS MODERATE 35: CPT | Performed by: PHYSICIAN ASSISTANT

## 2021-07-21 RX ORDER — AMOXICILLIN 250 MG
1 CAPSULE ORAL 2 TIMES DAILY
Status: DISCONTINUED | OUTPATIENT
Start: 2021-07-21 | End: 2021-07-22 | Stop reason: HOSPADM

## 2021-07-21 RX ORDER — TRAMADOL HYDROCHLORIDE 50 MG/1
50 TABLET ORAL EVERY 6 HOURS PRN
Status: DISCONTINUED | OUTPATIENT
Start: 2021-07-21 | End: 2021-07-21

## 2021-07-21 RX ORDER — POLYETHYLENE GLYCOL 3350 17 G/17G
17 POWDER, FOR SOLUTION ORAL DAILY PRN
Status: DISCONTINUED | OUTPATIENT
Start: 2021-07-21 | End: 2021-07-22 | Stop reason: HOSPADM

## 2021-07-21 RX ORDER — OXYBUTYNIN CHLORIDE 5 MG/1
5 TABLET ORAL 3 TIMES DAILY PRN
Status: DISCONTINUED | OUTPATIENT
Start: 2021-07-21 | End: 2021-07-22 | Stop reason: HOSPADM

## 2021-07-21 RX ORDER — HYDROMORPHONE HYDROCHLORIDE 1 MG/ML
0.5 INJECTION, SOLUTION INTRAMUSCULAR; INTRAVENOUS; SUBCUTANEOUS ONCE
Status: COMPLETED | OUTPATIENT
Start: 2021-07-21 | End: 2021-07-21

## 2021-07-21 RX ORDER — OXYCODONE HYDROCHLORIDE 5 MG/1
5-10 TABLET ORAL EVERY 6 HOURS PRN
Status: DISCONTINUED | OUTPATIENT
Start: 2021-07-21 | End: 2021-07-22 | Stop reason: HOSPADM

## 2021-07-21 RX ORDER — HYDROMORPHONE HYDROCHLORIDE 1 MG/ML
.3-.5 INJECTION, SOLUTION INTRAMUSCULAR; INTRAVENOUS; SUBCUTANEOUS
Status: DISCONTINUED | OUTPATIENT
Start: 2021-07-21 | End: 2021-07-22 | Stop reason: HOSPADM

## 2021-07-21 RX ORDER — ATROPA BELLADONNA AND OPIUM 16.2; 3 MG/1; MG/1
30 SUPPOSITORY RECTAL EVERY 6 HOURS PRN
Status: DISCONTINUED | OUTPATIENT
Start: 2021-07-21 | End: 2021-07-22 | Stop reason: HOSPADM

## 2021-07-21 RX ADMIN — DOCUSATE SODIUM AND SENNOSIDES 1 TABLET: 8.6; 5 TABLET, FILM COATED ORAL at 21:31

## 2021-07-21 RX ADMIN — DOCUSATE SODIUM AND SENNOSIDES 1 TABLET: 8.6; 5 TABLET, FILM COATED ORAL at 10:57

## 2021-07-21 RX ADMIN — OXYBUTYNIN CHLORIDE 5 MG: 5 TABLET ORAL at 21:31

## 2021-07-21 RX ADMIN — HYDROMORPHONE HYDROCHLORIDE 0.3 MG: 1 INJECTION, SOLUTION INTRAMUSCULAR; INTRAVENOUS; SUBCUTANEOUS at 07:08

## 2021-07-21 RX ADMIN — OXYBUTYNIN CHLORIDE 5 MG: 5 TABLET ORAL at 10:57

## 2021-07-21 RX ADMIN — ATROPA BELLADONNA AND OPIUM 1 SUPPOSITORY: 16.2; 3 SUPPOSITORY RECTAL at 00:41

## 2021-07-21 RX ADMIN — METOPROLOL TARTRATE 25 MG: 25 TABLET, FILM COATED ORAL at 06:26

## 2021-07-21 RX ADMIN — ONDANSETRON 4 MG: 4 TABLET, ORALLY DISINTEGRATING ORAL at 15:55

## 2021-07-21 RX ADMIN — DEXTROAMPHETAMINE SACCHARATE, AMPHETAMINE ASPARTATE MONOHYDRATE, DEXTROAMPHETAMINE SULFATE, AND AMPHETAMINE SULFATE 30 MG: 7.5; 7.5; 7.5; 7.5 CAPSULE, EXTENDED RELEASE ORAL at 08:18

## 2021-07-21 RX ADMIN — HYDROMORPHONE HYDROCHLORIDE 0.5 MG: 1 INJECTION, SOLUTION INTRAMUSCULAR; INTRAVENOUS; SUBCUTANEOUS at 08:43

## 2021-07-21 RX ADMIN — HYDROMORPHONE HYDROCHLORIDE 0.5 MG: 1 INJECTION, SOLUTION INTRAMUSCULAR; INTRAVENOUS; SUBCUTANEOUS at 16:07

## 2021-07-21 RX ADMIN — DEXTROAMPHETAMINE SACCHARATE, AMPHETAMINE ASPARTATE, DEXTROAMPHETAMINE SULFATE, AND AMPHETAMINE SULFATE 20 MG: 2.5; 2.5; 2.5; 2.5 TABLET ORAL at 16:00

## 2021-07-21 RX ADMIN — TAMSULOSIN HYDROCHLORIDE 0.4 MG: 0.4 CAPSULE ORAL at 08:18

## 2021-07-21 RX ADMIN — HYDROMORPHONE HYDROCHLORIDE 0.5 MG: 1 INJECTION, SOLUTION INTRAMUSCULAR; INTRAVENOUS; SUBCUTANEOUS at 22:35

## 2021-07-21 ASSESSMENT — ACTIVITIES OF DAILY LIVING (ADL)
ADLS_ACUITY_SCORE: 17

## 2021-07-21 NOTE — PLAN OF CARE
Assessments: A&O x4. C/o of suprapubic/ lower abd pain. IV dilaudid, Tylenol and oxycodone given. Pt states IV dilaudid was effective. Denies nausea. Pink tinged urine with slight improvement.     Major Shift Events: hypotensive episode at around 4pm while patient ambulating, known taking daily flomax.  placed on bed for safety . Pt calls appropriately for assistance. Pt maintaned SBP around 100-110s    Treatment Plan: Ram, Flomax- monitor for side effects. Urology consult    Bedside Nurse: Regino Quiros RN

## 2021-07-21 NOTE — PROGRESS NOTES
[facility]  Hospitalist Progress Note  Glenn Madison MD, MD 07/21/2021  (Text Page)  Reason for Stay (Diagnosis): KAMALA on top of CKD  Bilateral hydronephrosis and hydroureters         Assessment and Plan:      Summary of Stay: Gil Chase is a 65 year old male with known history of ADD, CKD, diabetes mellitus, hypertension admitted on 7/19/2021 with abnormal lab testing and received a call back due to increased creatinine with complaints of abdominal tenderness for at least more than a week duration    Problem List:   1. KAMALA on top of CKD secondary to suspected bladder outlet obstruction likely from large prostate  2. Urinary retention with hydroureter and bilateral hydronephrosis  3. Non-insulin-requiring diabetes mellitus  4. Hypertension  5. Dyslipidemia  6. History of ADD  7. History of erectile dysfunction    Continue current care.  Remain at risk for clinical deterioration.  Optimize pain control.    -Appreciate extensive urology input with recommendations in place.  Will need close follow-up as outpatient.    -Plans to discharge with Ram catheter   -Most recent metabolic panel showing decreasing trend of creatinine.  Pending labs today.    -I increase his oral oxycodone given not much relief earlier with pain and bladder spasms.    May need to further minimize IV pain regimen later on once pain is under better control as potentially Pato is a good candidate for home discharge in the next 24 to 36 hours if he demonstrates improving trend with his creatinine levels.    Avoid further nephrotoxic agents  -Anticipating improvement with kidney function once obstruction improves and resolves  -Currently on Flomax  -Hold his HCTZ and ACE inhibitor's given concurrent KAMALA-blood pressure levels remain within acceptable normal ranges  -We will resume other home regimen for other medical conditions  -On insulin sliding scale    DVT Prophylaxis: Pneumatic Compression Devices  Code Status: Full  "Code  Discharge Dispo: home  Estimated Disch Date / # of Days until Disch: 24 to 36 hours        Interval History (Subjective):      Continuing service care today.  Seen and examined.  Chart reviewed.  Earlier it was reported that he has been having uncontrolled hypogastric Pain with bladder spasms  Afebrile.  Urine improving with transparent color, no further hematuria  Stable hemodynamics  No BM yet, passing flatus.  No nausea or vomiting.  Tolerating oral diet.              Physical Exam:      Last Vital Signs:  /67 (BP Location: Left arm)   Pulse 62   Temp 97.5  F (36.4  C) (Oral)   Resp 16   Ht 1.727 m (5' 7.99\")   Wt 97.8 kg (215 lb 11.2 oz)   SpO2 97%   BMI 32.80 kg/m      I/O last 3 completed shifts:  In: 650 [P.O.:650]  Out: 3550 [Urine:3550]  Wt Readings from Last 1 Encounters:   07/21/21 97.8 kg (215 lb 11.2 oz)     Vitals:    07/21/21 0624   Weight: 97.8 kg (215 lb 11.2 oz)       Constitutional: Awake, alert, cooperative, no apparent distress   Respiratory: Clear to auscultation bilaterally, no crackles or wheezing   Cardiovascular: Regular rate and rhythm, normal S1 and S2, and no murmur noted   Abdomen: Normal bowel sounds, soft, minimal distended, some tenderness at the hypogastric area   Skin: No rashes, no cyanosis, dry to touch   Neuro: Alert and oriented x3, no weakness, spontaneous and coherent speech   Extremities: No edema, normal range of motion   Other(s): Euthymic mood, not agitated    Indwelling Ram catheter in place with transparent urine   All other systems: Negative          Medications:      All current medications were reviewed with changes reflected in problem list.         Data:      All new lab and imaging data was reviewed.   Labs:  No results for input(s): CULT in the last 168 hours.  Recent Labs   Lab 07/20/21  0756 07/19/21  1814   WBC 7.6 6.8   HGB 12.3* 12.0*   HCT 37.0* 35.8*   MCV 90 89    212     Recent Labs   Lab 07/21/21  0829 07/21/21  0155 " 07/20/21  2345 07/20/21  1212 07/19/21  1814   NA  --   --   --  141 142   POTASSIUM  --   --   --  3.8 4.0   CHLORIDE  --   --   --  107 106   CO2  --   --   --  26 29   ANIONGAP  --   --   --  8 7   GLC 98 96 104* 98 85   BUN  --   --   --  51* 55*   CR  --   --   --  3.89* 4.63*   GFRESTIMATED  --   --   --  15* 12*   RACHEL  --   --   --  9.1 9.3     No results for input(s): SED, CRP in the last 168 hours.  Recent Labs   Lab 07/21/21  0829 07/21/21  0155 07/20/21  2345 07/20/21  2319 07/20/21  2303   GLC 98 96 104* 81 80     No results for input(s): INR in the last 168 hours.  No results for input(s): LIPASE in the last 168 hours.  No results for input(s): TROPONIN, TROPI, TROPR in the last 168 hours.    Invalid input(s): TROP, TROPONINIES   Imaging:   Results for orders placed or performed during the hospital encounter of 07/19/21   Abd/pelvis CT no contrast - Stone Protocol    Narrative    EXAM: CT ABDOMEN PELVIS W/O CONTRAST  LOCATION: [facility]  DATE/TIME: 7/19/2021 9:05 PM    INDICATION: Flank pain, kidney stone suspected Abdominal pain, hernia suspected  COMPARISON: None.  TECHNIQUE: CT scan of the abdomen and pelvis was performed without IV contrast. Multiplanar reformats were obtained. Dose reduction techniques were used.  CONTRAST: None.    FINDINGS:   LOWER CHEST: 1 small hiatal hernia suggested. Incidental calcified granuloma in the inferior lingula.    HEPATOBILIARY: Normal.    PANCREAS: Normal.    SPLEEN: Normal.    ADRENAL GLANDS: Normal.    KIDNEYS/BLADDER: Marked bilateral hydronephrosis and hydroureter. Marked urinary bladder distention, urinary bladder 25 cm long by 11 cm diameter. There is urinary bladder wall thickening without calcification. No renal or ureteral calcification.    BOWEL: Normal.    LYMPH NODES: Normal.    VASCULATURE: Unremarkable.    PELVIC ORGANS: Moderate prostate gland enlargement.    MUSCULOSKELETAL: Prior left femoral neck fracture repair suggested.       Impression    IMPRESSION:   1.  Severe bilateral hydronephrosis and hydroureter as well as marked urinary bladder distention. Findings suggest bladder outlet obstruction which may be secondary to prostate gland enlargement. No urinary tract calcification.

## 2021-07-21 NOTE — PLAN OF CARE
A&Ox4. Up independent in room. C/o of 10/10 pain at start of shift, extra dose IV Dilaudid and Oxybutynin given. Decrease in pain reported and minimal pain this afternoon. Ram in place, yellow output, no blood noted. Tolerating diet. BG 98 and 82. Started on scheduled bowel meds. Oxycodone dose increased if needed. BP stable. Creatinine 3.27 this AM. Discharge pending.

## 2021-07-21 NOTE — PROGRESS NOTES
[facility] Progress Note          Assessment and Plan:     Assessment:    Urinary retention    Bilateral hydronephrosis    BPH    Elevated PSA    Acute renal failure, unspecified acute renal failure type (H)    Abdominal pain  Plan:   -Manage urinary retention with indwelling catheter  -continue Flomax 0.4mg QD - monitor for orthostatic hypotension as this is the most common side effect. (Male patients can experience retrograde ejaculation).   -f/u with urology for trial of void after at least 2 weeks. Our office will call to coordinate. Discussed possibility of CIC.  -Would recommend further evaluation to include cystocopy, MRI of the prostate (for elevated PSA), and likely urodynamics study to determine how obstructive his prostate is and how function his bladder is.  Orders placed. Our office will coordinate.  -B & O suppositories inpatient as needed.  Will add order for oxybutinin 5 mg TID PRN for bladder spasms.  This medicine should be avoided several days prior to TOV and UDS due to their effects on the bladder.  -Continue monitor urine output, creatinine, and electrolytes.  Patient had >3 L output yesterday.    Valerie Martinez PA-C   [facility]  [phone]               Interval History:     Doing okay.  Having bladder spasms.  Given B&O suppository without much relief.  Hematuria has cleared.  Ram: clear yellow urine.  Afebrile with no tachycardia.  Denies N/V/F/C/SOB/CP.  Creatinine 3.89 eGFR 15--improved yesterday, but labs not drawn yet today.  Urine output yesterday 3.550 L.  Patient also talked to his brother yesterday and found out his prostate procedure/removal was for cancer.              Review of Systems:     The 5 point Review of Systems is negative other than noted in the HPI             Medications:     Current Facility-Administered Medications Ordered in Epic   Medication Dose Route Frequency Last Rate Last Admin     acetaminophen (TYLENOL) tablet 650 mg   650 mg Oral Q6H PRN   650 mg at 07/20/21 2038    Or     acetaminophen (TYLENOL) Suppository 650 mg  650 mg Rectal Q6H PRN         amphetamine-dextroamphetamine (ADDERALL XR) per 24 hr capsule 30 mg  30 mg Oral Daily   30 mg at 07/21/21 0818     amphetamine-dextroamphetamine (ADDERALL) per tablet 20 mg  20 mg Oral Daily   20 mg at 07/20/21 1655     glucose gel 15-30 g  15-30 g Oral Q15 Min PRN        Or     dextrose 50 % injection 25-50 mL  25-50 mL Intravenous Q15 Min PRN        Or     glucagon injection 1 mg  1 mg Subcutaneous Q15 Min PRN         hydrALAZINE (APRESOLINE) injection 10 mg  10 mg Intravenous Q6H PRN         HYDROmorphone (PF) (DILAUDID) injection 0.3-0.5 mg  0.3-0.5 mg Intravenous Q2H PRN         insulin aspart (NovoLOG) injection (RAPID ACTING)  1-7 Units Subcutaneous TID AC         insulin aspart (NovoLOG) injection (RAPID ACTING)  1-5 Units Subcutaneous At Bedtime         lidocaine (LMX4) cream   Topical Q1H PRN         lidocaine 1 % 0.1-1 mL  0.1-1 mL Other Q1H PRN         melatonin tablet 1 mg  1 mg Oral At Bedtime PRN         metoprolol tartrate (LOPRESSOR) tablet 25 mg  25 mg Oral BID   25 mg at 07/21/21 0626     naloxone (NARCAN) injection 0.2 mg  0.2 mg Intravenous Q2 Min PRN        Or     naloxone (NARCAN) injection 0.4 mg  0.4 mg Intravenous Q2 Min PRN        Or     naloxone (NARCAN) injection 0.2 mg  0.2 mg Intramuscular Q2 Min PRN        Or     naloxone (NARCAN) injection 0.4 mg  0.4 mg Intramuscular Q2 Min PRN         ondansetron (ZOFRAN-ODT) ODT tab 4 mg  4 mg Oral Q6H PRN        Or     ondansetron (ZOFRAN) injection 4 mg  4 mg Intravenous Q6H PRN         opium-belladonna (B&O SUPPRETTES) 30-16.2 MG per suppository 1 suppository  30 mg Rectal Q6H PRN   1 suppository at 07/21/21 0041     oxyCODONE (ROXICODONE) tablet 5-10 mg  5-10 mg Oral Q6H PRN         polyethylene glycol (MIRALAX) Packet 17 g  17 g Oral Daily PRN         prochlorperazine (COMPAZINE) injection 5 mg  5 mg Intravenous  "Q6H PRN        Or     prochlorperazine (COMPAZINE) tablet 5 mg  5 mg Oral Q6H PRN        Or     prochlorperazine (COMPAZINE) suppository 12.5 mg  12.5 mg Rectal Q12H PRN         senna-docusate (SENOKOT-S/PERICOLACE) 8.6-50 MG per tablet 1 tablet  1 tablet Oral BID         sodium chloride (PF) 0.9% PF flush 3 mL  3 mL Intracatheter Q8H   3 mL at 07/20/21 1900     sodium chloride (PF) 0.9% PF flush 3 mL  3 mL Intracatheter q1 min prn         tamsulosin (FLOMAX) capsule 0.4 mg  0.4 mg Oral Daily   0.4 mg at 07/21/21 0818     No current [facility]-ordered outpatient medications on file.                  Physical Exam:   Vitals were reviewed  Patient Vitals for the past 8 hrs:   BP Temp Temp src Pulse Resp SpO2 Height Weight   07/21/21 0800 -- -- -- -- -- -- 1.727 m (5' 7.99\") --   07/21/21 0734 114/67 97.5  F (36.4  C) Oral 62 16 97 % -- --   07/21/21 0624 -- -- -- -- -- -- -- 97.8 kg (215 lb 11.2 oz)   07/21/21 0618 122/72 -- -- 66 -- -- -- --     GEN: NAD, sitting in chair and ambulates to bed  EYES: EOMI  MOUTH: MMM  NECK: Supple  RESP: Unlabored breathing, RA  CARDIAC: No LE edema  SKIN: Warm  ABD: soft, tender suprapubic area  NEURO: AAO  URO: Ram catheter in place draining clear yellow urine.           Data:   No results found for: NTBNPI, NTBNP  Lab Results   Component Value Date    WBC 7.6 07/20/2021    WBC 6.8 07/19/2021    WBC 9.0 05/21/2021    HGB 12.3 (L) 07/20/2021    HGB 12.0 (L) 07/19/2021    HGB 14.6 05/21/2021    HCT 37.0 (L) 07/20/2021    HCT 35.8 (L) 07/19/2021    HCT 42.7 05/21/2021    MCV 90 07/20/2021    MCV 89 07/19/2021    MCV 86 05/21/2021     07/20/2021     07/19/2021     05/21/2021     Creatinine yesterday afternoon 3.89 eGFR 15.  "

## 2021-07-21 NOTE — PROGRESS NOTES
Cross cover notified of patient with bladder spasms.  Here with retention.  Ordered B&O suppository as needed.

## 2021-07-21 NOTE — PLAN OF CARE
Assessments: A/O. VSS, no low BPs. C/o bladder spasms, dusty HELMS, ordered opium-belladonna suppository with relief. Urine in toledo mariela.    Major Shift Events: uneventful    Treatment Plan: [place], Flomax- monitor for side effects. Urology consult

## 2021-07-21 NOTE — TELEPHONE ENCOUNTER
----- Message from Valerie Martinez PA-C sent at 7/20/2021  5:40 PM CDT -----  Patient needs to have UDS, cystoscopy, and MRI of the prostate.  In retention with Ram, which needs to stay in for at least 2 weeks.  Could have TOV with RN with the likely need for CIC teaching.  Cysto with Dr. Chavez (he staffed).

## 2021-07-22 VITALS
TEMPERATURE: 97.5 F | DIASTOLIC BLOOD PRESSURE: 71 MMHG | HEIGHT: 68 IN | BODY MASS INDEX: 32.37 KG/M2 | SYSTOLIC BLOOD PRESSURE: 107 MMHG | OXYGEN SATURATION: 95 % | HEART RATE: 65 BPM | RESPIRATION RATE: 18 BRPM | WEIGHT: 213.6 LBS

## 2021-07-22 LAB
ANION GAP SERPL CALCULATED.3IONS-SCNC: 7 MMOL/L (ref 3–14)
BUN SERPL-MCNC: 44 MG/DL (ref 7–30)
CALCIUM SERPL-MCNC: 9.3 MG/DL (ref 8.5–10.1)
CHLORIDE BLD-SCNC: 104 MMOL/L (ref 94–109)
CO2 SERPL-SCNC: 26 MMOL/L (ref 20–32)
CREAT SERPL-MCNC: 3.14 MG/DL (ref 0.66–1.25)
GFR SERPL CREATININE-BSD FRML MDRD: 20 ML/MIN/1.73M2
GLUCOSE BLD-MCNC: 105 MG/DL (ref 70–99)
GLUCOSE BLDC GLUCOMTR-MCNC: 108 MG/DL (ref 70–99)
POTASSIUM BLD-SCNC: 3.7 MMOL/L (ref 3.4–5.3)
SODIUM SERPL-SCNC: 137 MMOL/L (ref 133–144)

## 2021-07-22 PROCEDURE — 250N000013 HC RX MED GY IP 250 OP 250 PS 637: Performed by: PHYSICIAN ASSISTANT

## 2021-07-22 PROCEDURE — 82374 ASSAY BLOOD CARBON DIOXIDE: CPT | Performed by: INTERNAL MEDICINE

## 2021-07-22 PROCEDURE — 36415 COLL VENOUS BLD VENIPUNCTURE: CPT | Performed by: INTERNAL MEDICINE

## 2021-07-22 PROCEDURE — 250N000013 HC RX MED GY IP 250 OP 250 PS 637: Performed by: INTERNAL MEDICINE

## 2021-07-22 PROCEDURE — 99232 SBSQ HOSP IP/OBS MODERATE 35: CPT | Performed by: PHYSICIAN ASSISTANT

## 2021-07-22 PROCEDURE — 99239 HOSP IP/OBS DSCHRG MGMT >30: CPT | Performed by: INTERNAL MEDICINE

## 2021-07-22 PROCEDURE — 250N000011 HC RX IP 250 OP 636: Performed by: INTERNAL MEDICINE

## 2021-07-22 RX ORDER — ALFUZOSIN HYDROCHLORIDE 10 MG/1
10 TABLET, EXTENDED RELEASE ORAL DAILY
Qty: 30 TABLET | Refills: 11 | Status: SHIPPED | OUTPATIENT
Start: 2021-07-22

## 2021-07-22 RX ORDER — OXYBUTYNIN CHLORIDE 5 MG/1
5 TABLET ORAL 3 TIMES DAILY PRN
Qty: 30 TABLET | Refills: 1 | Status: SHIPPED | OUTPATIENT
Start: 2021-07-22

## 2021-07-22 RX ORDER — OXYCODONE HYDROCHLORIDE 5 MG/1
5 TABLET ORAL EVERY 6 HOURS PRN
Qty: 12 TABLET | Refills: 0 | Status: SHIPPED | OUTPATIENT
Start: 2021-07-22 | End: 2021-07-25

## 2021-07-22 RX ADMIN — DOCUSATE SODIUM AND SENNOSIDES 1 TABLET: 8.6; 5 TABLET, FILM COATED ORAL at 09:01

## 2021-07-22 RX ADMIN — OXYBUTYNIN CHLORIDE 5 MG: 5 TABLET ORAL at 05:46

## 2021-07-22 RX ADMIN — HYDROMORPHONE HYDROCHLORIDE 0.5 MG: 1 INJECTION, SOLUTION INTRAMUSCULAR; INTRAVENOUS; SUBCUTANEOUS at 05:45

## 2021-07-22 RX ADMIN — METOPROLOL TARTRATE 25 MG: 25 TABLET, FILM COATED ORAL at 06:24

## 2021-07-22 RX ADMIN — HYDROMORPHONE HYDROCHLORIDE 0.5 MG: 1 INJECTION, SOLUTION INTRAMUSCULAR; INTRAVENOUS; SUBCUTANEOUS at 09:04

## 2021-07-22 RX ADMIN — DEXTROAMPHETAMINE SACCHARATE, AMPHETAMINE ASPARTATE MONOHYDRATE, DEXTROAMPHETAMINE SULFATE, AND AMPHETAMINE SULFATE 30 MG: 7.5; 7.5; 7.5; 7.5 CAPSULE, EXTENDED RELEASE ORAL at 09:01

## 2021-07-22 ASSESSMENT — MIFFLIN-ST. JEOR: SCORE: 1723.25

## 2021-07-22 ASSESSMENT — ACTIVITIES OF DAILY LIVING (ADL)
ADLS_ACUITY_SCORE: 17

## 2021-07-22 NOTE — PLAN OF CARE
Assessments: Afebrile. A&Ox4. Up independent in room. Ram draining well, output initially yellow/straw colored then became pink tinged afterwards. No dizziness lightheadedness per pt. VS stable. IV dilaudid given 1x with relief. Oxybutinin x1. No BM, senna given.     Treatment Plan: Ram, pain control, Oxybutynin, monitor urine output, Creatinine and electrolytes, outpatient ff-up with urology for futher eval    Bedside Nurse: Regino Quiros RN

## 2021-07-22 NOTE — DISCHARGE INSTRUCTIONS
Plan from Urology:  -Follow up with urology for trial of void after at least 2 weeks. Our office will call to coordinate.   -Would recommend further evaluation to include cystocopy, MRI of the prostate (for elevated PSA), and likely urodynamics study to determine how obstructive his prostate is and how function his bladder is.  Orders placed. Our office will coordinate.     Urology can be reached at:   [facility]  [phone]

## 2021-07-22 NOTE — PLAN OF CARE
A&Ox4. Up independent in room. C/o of mild pain and bladder spasms this shift, declined intervention. Discharged home today, wife providing transport. Writer discussed discharge educations with patient and wife. Rma education was done and educational handouts given. Oxycodone script was given to patient and he will will fill it at his home pharmacy. Leg bag and new large drainage bag sent with patient. He will call his PCP this afternoon to schedule a follow up appointment. Urology will call to schedule follow up appointment with patient. Patient and wife did not have any further questions or concerns at time of discharge.

## 2021-07-22 NOTE — PROGRESS NOTES
[facility] Progress Note          Assessment and Plan:     Assessment:    Urinary retention    Bilateral hydronephrosis    BPH    Elevated PSA    Acute renal failure, unspecified acute renal failure type (H)    Abdominal pain  Plan:   -Manage urinary retention with indwelling catheter  -Given decreased systolic blood pressure, would recommend patient take alfuzosin 10 mg daily, as orthostatic hypotension is less likely with this alpha-blocker.  (Male patients can experience retrograde ejaculation).   -f/u with urology for trial of void after at least 2 weeks. Our office will call to coordinate. Discussed possibility of CIC.  -Would recommend further evaluation to include cystocopy, MRI of the prostate (for elevated PSA), and likely urodynamics study to determine how obstructive his prostate is and how function his bladder is.  Orders placed. Our office will coordinate.  -Order for oxybutinin 5 mg TID PRN for bladder spasms.  This medicine should be avoided several days prior to TOV and UDS due to their effects on the bladder.  -Anticipate discharge later today.    Valerie Martinez PA-C   [facility]  [phone]               Interval History:     Doing okay.  Still having bladder spasms.  Was given some oxybutynin with some help.Hematuria has cleared.  Ram: clear yellow urine, was pink yesterday afternoon.  Afebrile with no tachycardia.  Denies V/F/C/SOB/CP. Slight nausea today.  Creatinine 3.14 eGFR 20 (3.27 eGFR 19 (3.89 eGFR 15)).  Plan for discharge today with urology outpatient follow up.  Noted hypotension on Flomax yesterday.  Patient's BP meds are being help due to KAMALA.              Review of Systems:     The 5 point Review of Systems is negative other than noted in the HPI             Medications:     Current Facility-Administered Medications Ordered in Epic   Medication Dose Route Frequency Last Rate Last Admin     acetaminophen (TYLENOL) tablet 650 mg  650 mg Oral Q6H  PRN   650 mg at 07/20/21 2038    Or     acetaminophen (TYLENOL) Suppository 650 mg  650 mg Rectal Q6H PRN         amphetamine-dextroamphetamine (ADDERALL XR) per 24 hr capsule 30 mg  30 mg Oral Daily   30 mg at 07/22/21 0901     amphetamine-dextroamphetamine (ADDERALL) per tablet 20 mg  20 mg Oral Daily   20 mg at 07/21/21 1600     glucose gel 15-30 g  15-30 g Oral Q15 Min PRN        Or     dextrose 50 % injection 25-50 mL  25-50 mL Intravenous Q15 Min PRN        Or     glucagon injection 1 mg  1 mg Subcutaneous Q15 Min PRN         hydrALAZINE (APRESOLINE) injection 10 mg  10 mg Intravenous Q6H PRN         HYDROmorphone (PF) (DILAUDID) injection 0.3-0.5 mg  0.3-0.5 mg Intravenous Q2H PRN   0.5 mg at 07/22/21 0904     insulin aspart (NovoLOG) injection (RAPID ACTING)  1-7 Units Subcutaneous TID AC         insulin aspart (NovoLOG) injection (RAPID ACTING)  1-5 Units Subcutaneous At Bedtime         lidocaine (LMX4) cream   Topical Q1H PRN         lidocaine 1 % 0.1-1 mL  0.1-1 mL Other Q1H PRN         melatonin tablet 1 mg  1 mg Oral At Bedtime PRN         metoprolol tartrate (LOPRESSOR) tablet 25 mg  25 mg Oral BID   25 mg at 07/22/21 0624     naloxone (NARCAN) injection 0.2 mg  0.2 mg Intravenous Q2 Min PRN        Or     naloxone (NARCAN) injection 0.4 mg  0.4 mg Intravenous Q2 Min PRN        Or     naloxone (NARCAN) injection 0.2 mg  0.2 mg Intramuscular Q2 Min PRN        Or     naloxone (NARCAN) injection 0.4 mg  0.4 mg Intramuscular Q2 Min PRN         ondansetron (ZOFRAN-ODT) ODT tab 4 mg  4 mg Oral Q6H PRN   4 mg at 07/21/21 1555    Or     ondansetron (ZOFRAN) injection 4 mg  4 mg Intravenous Q6H PRN         opium-belladonna (B&O SUPPRETTES) 30-16.2 MG per suppository 1 suppository  30 mg Rectal Q6H PRN   1 suppository at 07/21/21 0041     oxybutynin (DITROPAN) tablet 5 mg  5 mg Oral TID PRN   5 mg at 07/22/21 0546     oxyCODONE (ROXICODONE) tablet 5-10 mg  5-10 mg Oral Q6H PRN         polyethylene glycol  (MIRALAX) Packet 17 g  17 g Oral Daily PRN         prochlorperazine (COMPAZINE) injection 5 mg  5 mg Intravenous Q6H PRN        Or     prochlorperazine (COMPAZINE) tablet 5 mg  5 mg Oral Q6H PRN        Or     prochlorperazine (COMPAZINE) suppository 12.5 mg  12.5 mg Rectal Q12H PRN         senna-docusate (SENOKOT-S/PERICOLACE) 8.6-50 MG per tablet 1 tablet  1 tablet Oral BID   1 tablet at 07/22/21 0901     sodium chloride (PF) 0.9% PF flush 3 mL  3 mL Intracatheter Q8H   3 mL at 07/21/21 2131     sodium chloride (PF) 0.9% PF flush 3 mL  3 mL Intracatheter q1 min prn         tamsulosin (FLOMAX) capsule 0.4 mg  0.4 mg Oral Daily   0.4 mg at 07/21/21 0818     No current [facility]-ordered outpatient medications on file.                  Physical Exam:   Vitals were reviewed  Patient Vitals for the past 8 hrs:   BP Temp Temp src Pulse Resp SpO2 Weight   07/22/21 0803 107/71 97.5  F (36.4  C) Oral 65 18 95 % --   07/22/21 0624 110/61 -- -- 77 -- -- --   07/22/21 0543 -- -- -- -- -- -- 96.9 kg (213 lb 9.6 oz)     GEN: NAD, sitting in chair and ambulates to bed  EYES: EOMI  MOUTH: MMM  NECK: Supple  RESP: Unlabored breathing, RA  CARDIAC: No LE edema  SKIN: Warm  ABD: soft, tender suprapubic area  NEURO: AAO  URO: Ram catheter in place draining clear yellow urine.           Data:   No results found for: NTBNPI, NTBNP  Lab Results   Component Value Date    WBC 7.6 07/20/2021    WBC 6.8 07/19/2021    WBC 9.0 05/21/2021    HGB 12.3 (L) 07/20/2021    HGB 12.0 (L) 07/19/2021    HGB 14.6 05/21/2021    HCT 37.0 (L) 07/20/2021    HCT 35.8 (L) 07/19/2021    HCT 42.7 05/21/2021    MCV 90 07/20/2021    MCV 89 07/19/2021    MCV 86 05/21/2021     07/20/2021     07/19/2021     05/21/2021     Creatinine yesterday afternoon 3.89 eGFR 15.

## 2021-07-22 NOTE — PROGRESS NOTES
"/71 (BP Location: Right arm)   Pulse 81   Temp 97.8  F (36.6  C) (Oral)   Resp 18   Ht 1.727 m (5' 7.99\")   Wt 97.8 kg (215 lb 11.2 oz)   SpO2 96%   BMI 32.80 kg/m      Pt A&Ox4. VSS. C/o pain & IV dilaudid & robaxin given. Independent in room. [place] in place-clear, pink urine output. Will discharge with toledo & f/u outpt with urology. Continue monitoring urine output, creat & electrolytes.    Deann Vallejo RN     "

## 2021-07-22 NOTE — PLAN OF CARE
Assessments: Afebrile. A&Ox4. Up independent in room. Ram draining well, output initially yellow/straw colored then became pink tinged afterwards with some small light clots. No dizziness lightheadedness per pt. VS stable. IV 0.5mg dilaudid given 2x during shift with relief. Oxybutinin x1. No BM, senna given.     Treatment Plan: Ram, pain control, Oxybutynin, monitor urine output, Creatinine and electrolytes, ff-up with urology for futher eval    Bedside Nurse: Regino Quiros RN

## 2021-07-22 NOTE — DISCHARGE SUMMARY
Hospitalist Discharge Summary  [facility]    Gil Chase MRN# [id number]   YOB: 1955 Age: 65 year old     Date of Admission:  7/19/2021  Date of Discharge:  7/22/2021 12:38 PM  Admitting Physician:  Randall Payne MD  Discharge Physician:  Javad Dallas DO  Discharging Service:  Hospitalist     Primary Provider: Niko Badillo          Discharge Diagnosis:     1.  Acute Kidney Injury with CKD stage 2  - Likely secondary to bladder outlet obstruction/urinary retention  - Cr improved with placement of toledo  - Improving     2.  Urinary Retention  - Likely secondary to enlarged prostate  - Discussed with Urology - continue oxybutynin prn bladder spasms, f/u in 2 weeks for TOV and uro studies  - Discontinue flomax secondary to hypotension  - Urology recommendations appreciated     3.  Hypertension  - Lisinopril and hydrochlorothiazide on hold secondary to KAMALA  - BP actually slightly low  - Continue to hold until reevaluated by PCP in 1 week for repeat BMP and vitals check.       Chronic Medical Problems:  Diabetes Mellitus Type 2  Dyslipidemia  ADD  ED             Discharge Disposition:     Discharged to home           Allergies:     No Known Allergies           Discharge Medications:     Discharge Medication List as of 7/22/2021 12:09 PM      START taking these medications    Details   alfuzosin ER (UROXATRAL) 10 MG 24 hr tablet Take 1 tablet (10 mg) by mouth daily, Disp-30 tablet, R-11, E-Prescribe      oxybutynin (DITROPAN) 5 MG tablet Take 1 tablet (5 mg) by mouth 3 times daily as needed for bladder spasms, Disp-30 tablet, R-1, E-Prescribe      oxyCODONE (ROXICODONE) 5 MG tablet Take 1 tablet (5 mg) by mouth every 6 hours as needed for pain, Disp-12 tablet, R-0, Local Print         CONTINUE these medications which have NOT CHANGED    Details   amphetamine-dextroamphetamine (ADDERALL XR) 30 MG 24 hr capsule Take 30 mg by mouth daily, Historical     "  amphetamine-dextroamphetamine (ADDERALL) 20 MG tablet Take 20 mg by mouth daily, Historical      SEMAGLUTIDE,0.25 OR 0.5MG/DOS, SC Inject 0.5 mg Subcutaneous once a week On Monday, Historical      tadalafil (CIALIS) 5 MG tablet Take 5 mg by mouth every 24 hours, Historical         STOP taking these medications       hydrochlorothiazide (HYDRODIURIL) 25 MG tablet Comments:   Reason for Stopping:         lisinopril (PRINIVIL/ZESTRIL) 10 MG tablet Comments:   Reason for Stopping:                      Condition on Discharge:     Discharge condition: Fair   Discharge vitals: Blood pressure 107/71, pulse 65, temperature 97.5  F (36.4  C), temperature source Oral, resp. rate 18, height 1.727 m (5' 7.99\"), weight 96.9 kg (213 lb 9.6 oz), SpO2 95 %.   Code status on discharge: Full Code      BASIC PHYSICAL EXAMINATION:  GENERAL: No apparent distress.  CARDIOVASCULAR: Regular rate and rhythm without murmurs.  PULMONARY: Clear to auscultation bilaterally.   GASTROINTESTINAL: Abdomen soft, non-tender.  EXTREMITIES: No edema, pulses intact.  NEUROLOGIC: No focal deficits.            History of Illness:   See detailed admission note for full details.               Procedures excluding imaging which is summarized below:     Please see details in the electronic medical record.           Consultations:     UROLOGY IP CONSULT          Significant Results:     Results for orders placed or performed during the hospital encounter of 07/19/21   Abd/pelvis CT no contrast - Stone Protocol    Narrative    EXAM: CT ABDOMEN PELVIS W/O CONTRAST  LOCATION: [facility]  DATE/TIME: 7/19/2021 9:05 PM    INDICATION: Flank pain, kidney stone suspected Abdominal pain, hernia suspected  COMPARISON: None.  TECHNIQUE: CT scan of the abdomen and pelvis was performed without IV contrast. Multiplanar reformats were obtained. Dose reduction techniques were used.  CONTRAST: None.    FINDINGS:   LOWER CHEST: 1 small hiatal hernia suggested. " Incidental calcified granuloma in the inferior lingula.    HEPATOBILIARY: Normal.    PANCREAS: Normal.    SPLEEN: Normal.    ADRENAL GLANDS: Normal.    KIDNEYS/BLADDER: Marked bilateral hydronephrosis and hydroureter. Marked urinary bladder distention, urinary bladder 25 cm long by 11 cm diameter. There is urinary bladder wall thickening without calcification. No renal or ureteral calcification.    BOWEL: Normal.    LYMPH NODES: Normal.    VASCULATURE: Unremarkable.    PELVIC ORGANS: Moderate prostate gland enlargement.    MUSCULOSKELETAL: Prior left femoral neck fracture repair suggested.      Impression    IMPRESSION:   1.  Severe bilateral hydronephrosis and hydroureter as well as marked urinary bladder distention. Findings suggest bladder outlet obstruction which may be secondary to prostate gland enlargement. No urinary tract calcification.           Transthoracic Echocardiogram Results:  No results found for this or any previous visit (from the past 4320 hour(s)).             Pending Results:     Unresulted Labs Ordered in the Past 30 Days of this Admission     No orders found from 6/19/2021 to 7/20/2021.                      Discharge Instructions and Follow-Up:     Discharge instructions and follow-up:   Discharge Procedure Orders   Reason for your hospital stay   Order Comments: Acute Kidney Injury, Urinary Outlet Obstruction     Follow-up and recommended labs and tests    Order Comments: Follow up with primary care provider, Niko Badillo, within 7 days for hospital follow- up.  The following labs/tests are recommended: CBC, BMP.  Do not take your lisinopril or hydrochlorothiazde until you see your primary care doctor and have your kidney function rechecked.    Follow up with Urology in 2 weeks for hospital follow up.  Maintain your toledo catheter.  Stop using the oxybutynin three days prior to your Urology appointment.  Urology will call you for an appointment.     Activity   Order Comments: Your activity  upon discharge: activity as tolerated     Order Specific Question Answer Comments   Is discharge order? Yes      Diet   Order Comments: Follow this diet upon discharge: Orders Placed This Encounter      Combination Consistent Carb 90 Grams CHO per Meal Diet     Order Specific Question Answer Comments   Is discharge order? Yes              Hospital Course:     Gil Chase is a 65 year old male with a history of ADD, CKD, diabetes mellitus, hypertension admitted on 7/19/2021 with abnormal lab testing and received a call back due to increased creatinine with complaints of abdominal tenderness for at least more than a week duration.  In the emergency department the patient was found to have a creatinine of 4.63.  The patient had a CT abdomen and pelvis showing severe bilateral hydronephrosis and hydroureter with marked urinary bladder distention with concerns for bladder outlet obstruction secondary to prostate gland enlargement.  The patient had a Ram catheter placed and was placed on IV fluids.  The patient was seen by urology who recommended starting Flomax and maintaining Ram catheter with plans for a trial of void after 2 weeks as an outpatient.  They also recommended close urology follow-up with a possible cystoscopy versus MRI of the prostate secondary to elevated PSA versus urodynamic study.  The patient did become hypotensive secondary to his Flomax and start alfuzosin.  Plan to discharge home today with urology follow-up as an outpatient.    The patient was seen, examined, and counseled on this day. The hospitalization and plan of care was reviewed with the patient extensively. All questions were addressed and the patient agreed to follow-up as noted above.      Total time spent in face to face contact with the patient and coordinating discharge was:  34 Minutes    Javad Dallas DO  [facility] Hospitalist  [address] [place] [address]  07/22/2021

## 2021-07-22 NOTE — PROGRESS NOTES
[facility]    Hospitalist Progress Note  Name: Gil Chase    MRN: [id number]  Provider:  Javad Dallas DO  Date of Service: 07/22/2021    Summary of Stay: Gil Chase is a 65 year old male with a history of ADD, CKD, diabetes mellitus, hypertension admitted on 7/19/2021 with abnormal lab testing and received a call back due to increased creatinine with complaints of abdominal tenderness for at least more than a week duration.  In the emergency department the patient was found to have a creatinine of 4.63.  The patient had a CT abdomen and pelvis showing severe bilateral hydronephrosis and hydroureter with marked urinary bladder distention with concerns for bladder outlet obstruction secondary to prostate gland enlargement.  The patient had a Toledo catheter placed and was placed on IV fluids.  The patient was seen by urology who recommended starting Flomax and maintaining Toledo catheter with plans for a trial of void after 2 weeks as an outpatient.  They also recommended close urology follow-up with a possible cystoscopy versus MRI of the prostate secondary to elevated PSA versus urodynamic study.  The patient did become hypotensive secondary to his Flomax.  Plan to discharge home today with urology follow-up as an outpatient.    TODAY'S PLAN:  Discharge home.  Discussed with s/o at bedside.  All questions answered.    Problem List:   1.  Acute Kidney Injury with CKD stage 2  - Likely secondary to bladder outlet obstruction/urinary retention  - Cr improved with placement of toledo  - Improving    2.  Urinary Retention  - Likely secondary to enlarged prostate  - Discussed with Urology - continue oxybutynin prn bladder spasms, f/u in 2 weeks for TOV and uro studies  - Discontinue flomax secondary to hypotension  - Urology recommendations appreciated    3.  Hypertension  - Lisinopril and hydrochlorothiazide on hold secondary to KAMALA  - BP actually slightly low  - Continue to hold until  reevaluated by PCP in 1 week for repeat BMP and vitals check.      Chronic Medical Problems:  Diabetes Mellitus Type 2  Dyslipidemia  ADD  ED    DVT Prophylaxis: Pneumatic Compression Devices  Code Status: Full Code  Diet: Combination Diet Renal Diet; Consistent Carb 90 Grams CHO per Meal Diet    Ram Catheter: PRESENT, indication: Retention  Disposition: Expected discharge today to home. Goals prior to discharge include creatinine improving.   Family updated today: Yes      Interval History   Pt seen and examined.  Pt reports some bladder pain overnight.      -Data reviewed today: I personally reviewed all new labs and imaging results over the last 24 hours.     Physical Exam   Temp: 97.5  F (36.4  C) Temp src: Oral BP: 107/71 Pulse: 65   Resp: 18 SpO2: 95 % O2 Device: None (Room air)    Vitals:    07/21/21 0624 07/22/21 0543   Weight: 97.8 kg (215 lb 11.2 oz) 96.9 kg (213 lb 9.6 oz)     Vital Signs with Ranges  Temp:  [97.5  F (36.4  C)-97.8  F (36.6  C)] 97.5  F (36.4  C)  Pulse:  [65-82] 65  Resp:  [16-18] 18  BP: ()/(59-71) 107/71  SpO2:  [95 %-99 %] 95 %  I/O last 3 completed shifts:  In: -   Out: 3210 [Urine:3210]    GENERAL: No apparent distress. Awake, alert, and fully oriented.  HEENT: Normocephalic, atraumatic. Extraocular movements intact.  CARDIOVASCULAR: Regular rate and rhythm without murmurs or rubs. No S3.  PULMONARY: Clear bilaterally.  GASTROINTESTINAL: Soft, non-tender, non-distended. Bowel sounds normoactive.   EXTREMITIES: No cyanosis or clubbing. No edema.  NEUROLOGICAL: CN 2-12 grossly intact, no focal neurological deficits.  DERMATOLOGICAL: No rash, ulcer, bruising, nor jaundice.    Medications       amphetamine-dextroamphetamine  30 mg Oral Daily     amphetamine-dextroamphetamine  20 mg Oral Daily     insulin aspart  1-7 Units Subcutaneous TID AC     insulin aspart  1-5 Units Subcutaneous At Bedtime     metoprolol tartrate  25 mg Oral BID     senna-docusate  1 tablet Oral BID      sodium chloride (PF)  3 mL Intracatheter Q8H     tamsulosin  0.4 mg Oral Daily     Data     Laboratory:  Recent Labs   Lab 07/20/21  0756 07/19/21  1814   WBC 7.6 6.8   HGB 12.3* 12.0*   HCT 37.0* 35.8*   MCV 90 89    212     Recent Labs   Lab 07/22/21  0719 07/22/21  0202 07/21/21  2119 07/21/21  1000 07/20/21  1212     --   --  138 141   POTASSIUM 3.7  --   --  3.7 3.8   CHLORIDE 104  --   --  105 107   CO2 26  --   --  27 26   ANIONGAP 7  --   --  6 8   * 108* 91 101* 98   BUN 44*  --   --  43* 51*   CR 3.14*  --   --  3.27* 3.89*   GFRESTIMATED 20*  --   --  19* 15*   RACHEL 9.3  --   --  9.4 9.1     No results for input(s): CULT in the last 168 hours.    Imaging:  No results found for this or any previous visit (from the past 24 hour(s)).      Javad Dallas DO  [facility] Hospitalist  [address] [place] [address]  07/22/2021

## 2021-07-23 ENCOUNTER — PATIENT OUTREACH (OUTPATIENT)
Dept: CARE COORDINATION | Facility: CLINIC | Age: 66
End: 2021-07-23

## 2021-07-23 DIAGNOSIS — Z71.89 OTHER SPECIFIED COUNSELING: ICD-10-CM

## 2021-07-23 NOTE — PROGRESS NOTES
Clinic Care Coordination Contact  [facility]/Voicemail       Clinical Data: Care Coordinator Outreach - TCM    Outreach attempted x 1.  Left message on patient's voicemail with call back information and requested return call.    Plan: Care Coordinator will try to reach patient again in 1-2 business days.        Imelda Danielle RN Clinic Care Coordination

## 2021-07-26 NOTE — PROGRESS NOTES
Clinic Care Coordination Contact    Background: Care Coordination referral placed from [facility] discharge report for reason of patient meeting criteria for a TCM outreach call by [facility] team.    Assessment: Upon chart review, [facility] Team member will cancel/close the referral for TCM outreach due to reason below:     Patient saw PCP this morning, for a hospital discharge follow up appt.    Plan: Care Coordination referral for TCM outreach canceled.    Imelda Danielle RN  [facility], [facility]

## 2021-07-27 ENCOUNTER — TELEPHONE (OUTPATIENT)
Dept: UROLOGY | Facility: CLINIC | Age: 66
End: 2021-07-27

## 2021-07-28 ENCOUNTER — TELEPHONE (OUTPATIENT)
Dept: UROLOGY | Facility: CLINIC | Age: 66
End: 2021-07-28

## 2021-08-02 ENCOUNTER — ALLIED HEALTH/NURSE VISIT (OUTPATIENT)
Dept: UROLOGY | Facility: CLINIC | Age: 66
End: 2021-08-02
Payer: COMMERCIAL

## 2021-08-02 DIAGNOSIS — R33.9 URINARY RETENTION: Primary | ICD-10-CM

## 2021-08-02 PROCEDURE — 99207 PR NO CHARGE NURSE ONLY: CPT

## 2021-08-02 NOTE — PROGRESS NOTES
Gil Chase comes into clinic today at the request of Valerie ARREDONDO Ordering Provider for TOV (trial of void).  This service provided today was under the supervising provider of the day Lili Rahman PAC, who was available if needed.   Patient's catheter was disconnected from the leg bag and a sterile syringe was attached to the catheter with 410 cc of sterile H2O instilled into the bladder via gravity with ease.Patient had strong urge to void The catheter was removed with no complaints offered by the patient and the procedure was tolerated well.    Patient was unable to void.Patient was shown video on self catheterization and was able to demonstrate back with some assistance.He will catheterize three times per day,paperwork completed for catheters.  He will schedule UDS and follow up as planned.  Ruth Thompson LPN

## 2021-08-03 ENCOUNTER — MYC MEDICAL ADVICE (OUTPATIENT)
Dept: OTHER | Age: 66
End: 2021-08-03

## 2021-08-04 NOTE — TELEPHONE ENCOUNTER
Not a patient within the Primary Care Clinic. Pt will need to schedule an appointment with a provider for further communication. Sonia Manjarrez LPN 8/4/2021 8:29 AM

## 2021-09-12 ENCOUNTER — HEALTH MAINTENANCE LETTER (OUTPATIENT)
Age: 66
End: 2021-09-12

## 2022-11-19 ENCOUNTER — HEALTH MAINTENANCE LETTER (OUTPATIENT)
Age: 67
End: 2022-11-19

## 2023-09-06 NOTE — PHARMACY-ADMISSION MEDICATION HISTORY
Admission medication history interview status for this patient is complete. See [facility] admission navigator for allergy information, prior to admission medications and immunization status.     Medication history interview done, indicate source(s): Patient  Medication history resources (including written lists, pill bottles, clinic record):None  Pharmacy: -    Changes made to PTA medication list:  Added: cialis  Deleted: -  Changed: -    Actions taken by pharmacist (provider contacted, etc):None     Additional medication history information:None    Medication reconciliation/reorder completed by provider prior to medication history?  no   (Y/N)     For patients on insulin therapy:   Do you use sliding scale insulin based on blood sugars?   What is your pre-meal insulin coverage?    Do you typically eat three meals a day?   How many times do you check your blood glucose per day?   How many episodes of hypoglycemia do you typically have per month?   Do you have a Continuous Glucose Monitor (CGM)?      Prior to Admission medications    Medication Sig Last Dose Taking? Auth Provider   amphetamine-dextroamphetamine (ADDERALL XR) 30 MG 24 hr capsule Take 30 mg by mouth daily 7/19/2021 at 0700 Yes Reported, Patient   amphetamine-dextroamphetamine (ADDERALL) 20 MG tablet Take 20 mg by mouth daily 7/19/2021 at 1700 Yes Unknown, Entered By History   hydrochlorothiazide (HYDRODIURIL) 25 MG tablet Take 25 mg by mouth daily  7/19/2021 at am Yes Reported, Patient   lisinopril (PRINIVIL/ZESTRIL) 10 MG tablet Take 10 mg by mouth daily 7/19/2021 at am Yes Reported, Patient   SEMAGLUTIDE,0.25 OR 0.5MG/DOS, SC Inject 0.5 mg Subcutaneous once a week On Monday 7/19/2021 at Unknown time Yes Unknown, Entered By History   tadalafil (CIALIS) 5 MG tablet Take 5 mg by mouth every 24 hours 7/19/2021 at Unknown time Yes Unknown, Entered By History        [Time Spent: ___ minutes] : I have spent [unfilled] minutes of time on the encounter.

## 2023-09-09 ENCOUNTER — HEALTH MAINTENANCE LETTER (OUTPATIENT)
Age: 68
End: 2023-09-09

## 2025-03-17 ENCOUNTER — APPOINTMENT (OUTPATIENT)
Dept: GENERAL RADIOLOGY | Facility: CLINIC | Age: 70
End: 2025-03-17
Payer: COMMERCIAL

## 2025-03-17 ENCOUNTER — HOSPITAL ENCOUNTER (EMERGENCY)
Facility: CLINIC | Age: 70
Discharge: HOME | End: 2025-03-17
Payer: COMMERCIAL

## 2025-03-17 VITALS
SYSTOLIC BLOOD PRESSURE: 148 MMHG | TEMPERATURE: 97.5 F | BODY MASS INDEX: 36.22 KG/M2 | HEIGHT: 68 IN | OXYGEN SATURATION: 98 % | WEIGHT: 239 LBS | RESPIRATION RATE: 18 BRPM | HEART RATE: 83 BPM | DIASTOLIC BLOOD PRESSURE: 83 MMHG

## 2025-03-17 DIAGNOSIS — S61.210A LACERATION OF RIGHT INDEX FINGER WITHOUT FOREIGN BODY WITHOUT DAMAGE TO NAIL, INITIAL ENCOUNTER: ICD-10-CM

## 2025-03-17 PROCEDURE — 250N000011 HC RX IP 250 OP 636

## 2025-03-17 PROCEDURE — 73140 X-RAY EXAM OF FINGER(S): CPT | Mod: RT

## 2025-03-17 PROCEDURE — 96372 THER/PROPH/DIAG INJ SC/IM: CPT

## 2025-03-17 PROCEDURE — 250N000009 HC RX 250

## 2025-03-17 PROCEDURE — 12001 RPR S/N/AX/GEN/TRNK 2.5CM/<: CPT | Mod: F6

## 2025-03-17 PROCEDURE — 90471 IMMUNIZATION ADMIN: CPT

## 2025-03-17 PROCEDURE — 90715 TDAP VACCINE 7 YRS/> IM: CPT

## 2025-03-17 PROCEDURE — 99283 EMERGENCY DEPT VISIT LOW MDM: CPT | Mod: 25

## 2025-03-17 RX ADMIN — LIDOCAINE HYDROCHLORIDE 5 ML: 10 INJECTION, SOLUTION EPIDURAL; INFILTRATION; INTRACAUDAL; PERINEURAL at 19:30

## 2025-03-17 RX ADMIN — CLOSTRIDIUM TETANI TOXOID ANTIGEN (FORMALDEHYDE INACTIVATED), CORYNEBACTERIUM DIPHTHERIAE TOXOID ANTIGEN (FORMALDEHYDE INACTIVATED), BORDETELLA PERTUSSIS TOXOID ANTIGEN (GLUTARALDEHYDE INACTIVATED), BORDETELLA PERTUSSIS FILAMENTOUS HEMAGGLUTININ ANTIGEN (FORMALDEHYDE INACTIVATED), BORDETELLA PERTUSSIS PERTACTIN ANTIGEN, AND BORDETELLA PERTUSSIS FIMBRIAE 2/3 ANTIGEN 0.5 ML: 5; 2; 2.5; 5; 3; 5 INJECTION, SUSPENSION INTRAMUSCULAR at 19:29

## 2025-03-17 ASSESSMENT — COLUMBIA-SUICIDE SEVERITY RATING SCALE - C-SSRS
2. HAVE YOU ACTUALLY HAD ANY THOUGHTS OF KILLING YOURSELF IN THE PAST MONTH?: NO
6. HAVE YOU EVER DONE ANYTHING, STARTED TO DO ANYTHING, OR PREPARED TO DO ANYTHING TO END YOUR LIFE?: NO
1. IN THE PAST MONTH, HAVE YOU WISHED YOU WERE DEAD OR WISHED YOU COULD GO TO SLEEP AND NOT WAKE UP?: NO

## 2025-03-17 ASSESSMENT — ACTIVITIES OF DAILY LIVING (ADL)
ADLS_ACUITY_SCORE: 46

## 2025-03-17 NOTE — ED TRIAGE NOTES
Arrives ambulatory from home. States cut his right pointer finger, happened around 1430.     Noted to have an aprox 3 cm straight cut down the top of his finger including the joint.

## 2025-03-17 NOTE — ED PROVIDER NOTES
"  Emergency Department Note      History of Present Illness     Chief Complaint   Laceration      HPI   Gil Chase is a right-handed 69 year old male with a history of stage III CKD, cardiomyopathy,and type II diabetes who presents to the Emergency Department for a laceration. The patient reports he was using a grinding tool to cut metal when he cut his right second finger. He washed the area, bandaged the finger, and upon going to change the bandage, noticed the wound was deeper than expected. He endorses full strength and sensation to his finger.  Last Tdap 2019.  Patient is right-hand dominant.    Independent Historian   None    Review of External Notes   None    Past Medical History     Medical History and Problem List   ADHD  Bilateral hydronephrosis   BPH  CKD, stage III  Cardiomyopathy   Cellulitis  Erectile dysfunction   Hypogonadism   Hypertension   Non-occlusive coronary artery disease   Renal insufficiency   Type II diabetes     Medications   Alfuzosin ER  Amphetamine-dextroamphetamine   Oxybutynin   Semaglutide    Surgical History   Vasectomy with subsequent reversal  TURP    Physical Exam     Patient Vitals for the past 24 hrs:   BP Temp Pulse Resp SpO2 Height Weight   03/17/25 1932 -- -- -- -- -- 1.727 m (5' 8\") 108.4 kg (239 lb)   03/17/25 1901 (!) 148/83 -- 83 18 98 % -- --   03/17/25 1752 (!) 156/106 -- -- -- -- -- --   03/17/25 1751 -- 97.5  F (36.4  C) 87 18 96 % -- --     Physical Exam  General: Alert and cooperative with exam.   Head:  Scalp is NC/AT  Eyes:  No scleral icterus, PERRL with normal tracking  ENT:  The external nose and ears are normal.   Neck:  Normal range of motion without rigidity.  Chest:  Normal effort.  No tachypnea or distress.  Skin:  2.5 cm laceration to the right dorsal aspect of pointer finger.  No evidence of foreign body.  Sensation intact to light touch.  Normal range of motion at MCP, PIP, DIP joints.  Capillary refill less than 2 seconds.  Normal radial " pulse.  Neuro:  No gross motor deficits.    No facial asymmetry.  GCS: 15.   Psych: Alert. Normal affect. Cooperative.    Diagnostics     Lab Results   Labs Ordered and Resulted from Time of ED Arrival to Time of ED Departure - No data to display    Imaging   XR Finger Right G/E 2 Views   Final Result   IMPRESSION: Three views of the index finger show soft tissue swelling. There is mild IP joint arthrosis. No evidence of an acute fracture or radiopaque foreign body.          EKG   None    Independent Interpretation   X-ray right finger shows no signs of fracture or foreign body.    ED Course      Medications Administered   Medications   Tdap (tetanus-diphtheria-acell pertussis) (ADACEL) injection 0.5 mL (0.5 mLs Intramuscular $Given 3/17/25 1929)   lidocaine 1 % 5 mL (5 mLs Subcutaneous $Given by Other 3/17/25 1930)       Procedures    Digital Block       Procedure: Digital Block    Indication: Wound care    Consent: Verbal    Preparation: Alcohol    Anesthesia: A digital block was performed with Lidocaine - 1% on the affected digit.     Location: R second (index) finger    Procedure Detail: A digital block was performed on the indicated digit with the above anesthetic.     Patient status: The patient tolerated the procedure well: Yes. There were no complications.      Laceration Repair      Procedure: Laceration Repair    Indication: Laceration    Consent: Verbal    Tetanus status reviewed    Location: Right R second (index) finger    Length: 2.5 cm    Preparation: Irrigation with Sterile Saline.    Anesthesia/Sedation: Lidocaine - 1%      Treatment/Exploration: Wound explored, no foreign bodies found     Closure: The wound was closed with one layer. Skin/superficial layer was closed with 3 x 5-0 Nylon using Interrupted sutures.     Patient Status: The patient tolerated the procedure well: Yes. There were no complications.     Discussion of Management   None    ED Course   ED Course as of 03/17/25 2344   Mon Mar  17, 2025 1816 I evaluated the patient, obtained history, and performed a physical exam as detailed above.    1912 I rechecked the patient and updated them on the plan of care. I numbed the patient's digit with 1% lidocaine.        Additional Documentation  None    Medical Decision Making / Diagnosis     CMS Diagnoses: None    MIPS       None    MDM   Gil Chase is a 69 year old male who presents with laceration to the right index finger.  Patient history and records reviewed.  On examination, the patient has a laceration, but is otherwise well appearing.  There is no evidence of neurovascular compromise, fracture, imbedded foreign body, or tendon injury.  X-ray was performed to evaluate for metallic foreign body, this thankfully was negative for foreign body or acute fracture.  Wound was anesthetized, irrigated, explored, and closed as above with 3 non-absorbale sutures.  Tetanus checked and updated.  Discussed indications to return to ED if new or worsening symptoms, or signs of infection such as fever, swelling, spreading erythema, drainage, or increasing pain.  Advised sun protection to reduce scarring.  Follow-up with primary care provider in 7-10 days for suture removal.    Disposition   The patient was discharged.     Diagnosis     ICD-10-CM    1. Laceration of right index finger without foreign body without damage to nail, initial encounter  S61.210A            Discharge Medications   Discharge Medication List as of 3/17/2025  8:17 PM        Scribe Disclosure:  I, uHma Gordillo, am serving as a scribe at 6:18 PM on 3/17/2025 to document services personally performed by Loren Garrison PA-C based on my observations and the provider's statements to me.        Loren Garrison PA-C  03/17/25 [id number]

## 2025-03-18 NOTE — ED NOTES
Pt discharged. Discharged instructions and papers given. Discharged ambulatory in stable condition. A&Ox4

## 2025-03-18 NOTE — DISCHARGE INSTRUCTIONS
Please have your sutures removed in 7 to 10 days.    Discharge Instructions  Laceration (Cut)    You were seen today for a laceration (cut).  Your provider examined your laceration for any problems such a buried foreign body (like glass, a splinter, or gravel), or injury to blood vessels, tendons, and nerves.  Your provider may have also rinsed and/or scrubbed your laceration to help prevent an infection. It may not be possible to find all problems with your laceration on the first visit; occasionally foreign bodies or a tendon injury can go undetected.    Your laceration may have been closed in one of several ways:  No closure: many wounds will heal just fine without closure.  Stitches: regular stitches that require removal.  Staples: skin staples are often used in the scalp/head.  Wound adhesive (glue): skin glue can be used for certain lacerations and doesn t require removal.  Wound strips (aka Butterfly bandages or steri-strips): these are bandages that help to close a wound.  Absorbable stitches:  dissolving  stitches that go away on their own and usually don t require removal.    A small percentage of wounds will develop an infection regardless of how well the wound is cared for. Antibiotics are generally not indicated to prevent an infection so are only given for a small number of high-risk wounds. Some lacerations are too high risk to close, and are left open to heal because closure can increase the likelihood that an infection will develop.    Remember that all lacerations, no matter how expertly repaired, will cause scarring. We consider many factors, techniques, and materials, in our efforts to provide the best possible cosmetic outcome.    Generally, every Emergency Department visit should have a follow-up clinic visit with either a primary or a specialty clinic/provider. Please follow-up as instructed by your emergency provider today.     Return to the Emergency Department right away if:  You have more  redness, swelling, pain, drainage (pus), a bad smell, or red streaking from your laceration as these symptoms could indicate an infection.  You have a fever of 100.4 F or more.  You have bleeding that you cannot stop at home. If your cut starts to bleed, hold pressure on the bleeding area with a clean cloth or put pressure over the bandage.  If the bleeding does not stop after using constant pressure for 30 minutes, you should return to the Emergency Department for further treatment.  An area past the laceration is cool, pale, or blue compared with the other side, or has a slower return of color when squeezed.  Your dressing seems too tight or starts to get uncomfortable or painful. For children, signs of a problem might be irritability or restlessness.  You have loss of normal function or use of an area, such as being unable to straighten or bend a finger normally.  You have a numb area past the laceration.    Return to the Emergency Department or see your regular provider if:  The laceration starts to come open.   You have something coming out of the cut or a feeling that there is something in the laceration.  Your wound will not heal, or keeps breaking open. There can always be glass, wood, dirt or other things in any wound.  They will not always show up, even on x-rays.  If a wound does not heal, this may be why, and it is important to follow-up with your regular provider.    Home Care:  Take your dressing off in 12-24 hours, or as instructed by your provider, to check your laceration. Remove the dressing sooner if it seems too tight or painful, or if it is getting numb, tingly, or pale past the dressing.  Gently wash your laceration 1-2 times daily with clean water and mild soap. It is okay to shower or run clean water over the laceration, but do not let the laceration soak in water (no swimming).  If your laceration was closed with wound adhesive or strips: pat it dry and leave it open to the air. For all  other repairs: after you wash your laceration, or at least 2 times a day, apply antibiotic ointment (such as Neosporin  or Bacitracin ) to the laceration, then cover it with a Band-Aid  or gauze.  Keep the laceration clean. Wear gloves or other protective clothing if you are around dirt.    Follow-up for removal:  If your wound was closed with staples or regular stitches, they need to be removed according to the instructions and timeline specified by your provider today.  If your wound was closed with absorbable ( dissolving ) sutures, they should fall out, dissolve, or not be visible in about one week. If they are still visible, then they should be removed according to the instructions and timeline specified by your provider today.    Scars:  To help minimize scarring:  Wear sunscreen over the healed laceration when out in the sun.  Massage the area regularly once healed.  You may apply Vitamin E to the healed wound.  Wait. Scars improve in appearance over months and years.    If you were given a prescription for medicine here today, be sure to read all of the information (including the package insert) that comes with your prescription.  This will include important information about the medicine, its side effects, and any warnings that you need to know about.  The pharmacist who fills the prescription can provide more information and answer questions you may have about the medicine.  If you have questions or concerns that the pharmacist cannot address, please call or return to the Emergency Department.       Remember that you can always come back to the Emergency Department if you are not able to see your regular provider in the amount of time listed above, if you get any new symptoms, or if there is anything that worries you.